# Patient Record
Sex: MALE | Race: WHITE | Employment: FULL TIME | URBAN - METROPOLITAN AREA
[De-identification: names, ages, dates, MRNs, and addresses within clinical notes are randomized per-mention and may not be internally consistent; named-entity substitution may affect disease eponyms.]

---

## 2017-07-25 ENCOUNTER — HOSPITAL ENCOUNTER (OUTPATIENT)
Dept: LAB | Age: 72
Discharge: HOME | End: 2017-07-25
Payer: COMMERCIAL

## 2017-07-25 ENCOUNTER — OFFICE VISIT (OUTPATIENT)
Dept: INTERNAL MEDICINE CLINIC | Age: 72
End: 2017-07-25

## 2017-07-25 VITALS
TEMPERATURE: 98.2 F | OXYGEN SATURATION: 97 % | BODY MASS INDEX: 29.82 KG/M2 | HEART RATE: 83 BPM | WEIGHT: 213 LBS | RESPIRATION RATE: 18 BRPM | HEIGHT: 71 IN | DIASTOLIC BLOOD PRESSURE: 63 MMHG | SYSTOLIC BLOOD PRESSURE: 139 MMHG

## 2017-07-25 DIAGNOSIS — J45.909 UNCOMPLICATED ASTHMA, UNSPECIFIED ASTHMA SEVERITY: ICD-10-CM

## 2017-07-25 DIAGNOSIS — E78.5 DYSLIPIDEMIA: Primary | ICD-10-CM

## 2017-07-25 DIAGNOSIS — Z76.0 MEDICATION REFILL: ICD-10-CM

## 2017-07-25 DIAGNOSIS — F41.9 ANXIETY: ICD-10-CM

## 2017-07-25 DIAGNOSIS — E78.5 DYSLIPIDEMIA: ICD-10-CM

## 2017-07-25 DIAGNOSIS — Z23 ENCOUNTER FOR IMMUNIZATION: ICD-10-CM

## 2017-07-25 LAB
ALBUMIN SERPL BCP-MCNC: 4.2 G/DL (ref 3.4–5)
ALBUMIN/GLOB SERPL: 1.3 {RATIO} (ref 0.8–1.7)
ALP SERPL-CCNC: 78 U/L (ref 45–117)
ALT SERPL-CCNC: 27 U/L (ref 16–61)
AST SERPL W P-5'-P-CCNC: 16 U/L (ref 15–37)
BILIRUB DIRECT SERPL-MCNC: 0.1 MG/DL (ref 0–0.2)
BILIRUB SERPL-MCNC: 0.4 MG/DL (ref 0.2–1)
CHOLEST SERPL-MCNC: 284 MG/DL
GLOBULIN SER CALC-MCNC: 3.2 G/DL (ref 2–4)
HDLC SERPL-MCNC: 62 MG/DL (ref 40–60)
HDLC SERPL: 4.6 {RATIO} (ref 0–5)
LDLC SERPL CALC-MCNC: 184.2 MG/DL (ref 0–100)
LIPID PROFILE,FLP: ABNORMAL
PROT SERPL-MCNC: 7.4 G/DL (ref 6.4–8.2)
TRIGL SERPL-MCNC: 189 MG/DL (ref ?–150)
VLDLC SERPL CALC-MCNC: 37.8 MG/DL

## 2017-07-25 PROCEDURE — 80076 HEPATIC FUNCTION PANEL: CPT | Performed by: INTERNAL MEDICINE

## 2017-07-25 PROCEDURE — 80061 LIPID PANEL: CPT | Performed by: INTERNAL MEDICINE

## 2017-07-25 PROCEDURE — 36415 COLL VENOUS BLD VENIPUNCTURE: CPT | Performed by: INTERNAL MEDICINE

## 2017-07-25 RX ORDER — SIMVASTATIN 40 MG/1
40 TABLET, FILM COATED ORAL DAILY
Qty: 90 TAB | Refills: 5 | Status: SHIPPED | OUTPATIENT
Start: 2017-07-25 | End: 2018-06-11 | Stop reason: SDUPTHER

## 2017-07-25 RX ORDER — VENLAFAXINE 75 MG/1
75 TABLET ORAL 3 TIMES DAILY
Qty: 270 TAB | Refills: 5 | Status: SHIPPED | OUTPATIENT
Start: 2017-07-25 | End: 2018-09-12 | Stop reason: SDUPTHER

## 2017-07-25 NOTE — PROGRESS NOTES
Chief Complaint   Patient presents with    Cholesterol Problem       Pt preferred language for health care discussion is English. Is someone accompanying this pt? no    Is the patient using any DME equipment during OV? no    Depression Screening:  PHQ over the last two weeks 7/25/2017 10/22/2016 10/15/2015   PHQ Not Done Active Diagnosis of Depression or Bipolar Disorder - -   Little interest or pleasure in doing things Not at all Several days Not at all   Feeling down, depressed or hopeless Not at all Several days Not at all   Total Score PHQ 2 0 2 0       Learning Assessment:  Learning Assessment 11/11/2016 12/16/2013   PRIMARY LEARNER Patient Patient   PRIMARY LANGUAGE ENGLISH ENGLISH   LEARNER PREFERENCE PRIMARY DEMONSTRATION LISTENING   ANSWERED BY Patient patient   RELATIONSHIP SELF SELF       Abuse Screening:  No flowsheet data found. Fall Risk  Fall Risk Assessment, last 12 mths 7/25/2017 11/11/2016 10/22/2016 10/15/2015   Able to walk? Yes Yes Yes Yes   Fall in past 12 months? No Yes Yes No   Fall with injury? - Yes No -   Number of falls in past 12 months - 2 - -   Fall Risk Score - 3 - -         Health Maintenance reviewed and discussed per provider. Yes    Pt is due for Colonoscopy ( Med Rec Request filled out and faxed), Eye exam (Med rec request filled out and faxed, pt has taken it home and will bring back can't remember who he has seen), Pneumo-13 or Peumo-23. Please order/place referral if appropriate. Advance Directive:  1. Do you have an advance directive in place? Patient Reply:no    2. If not, would you like material regarding how to put one in place? Patient Reply: no    Coordination of Care:  1. Have you been to the ER, urgent care clinic since your last visit? Hospitalized since your last visit? no    2. Have you seen or consulted any other health care providers outside of the [facility] since your last visit? Include any pap smears or colon screening.  no

## 2017-07-25 NOTE — PATIENT INSTRUCTIONS
Vaccine Information Statement     Pneumococcal Conjugate Vaccine (PCV13): What You Need to Know    Many Vaccine Information Statements are available in Vietnamese and other languages. See www.immunize.org/vis. Hojas de información Sobre Vacunas están disponibles en español y en muchos otros idiomas. Visite www.immunize.org/vis. 1. Why get vaccinated? Vaccination can protect both children and adults from pneumococcal disease. Pneumococcal disease is caused by bacteria that can spread from person to person through close contact. It can cause ear infections, and it can also lead to more serious infections of the:   Lungs (pneumonia),   Blood (bacteremia), and   Covering of the brain and spinal cord (meningitis). Pneumococcal pneumonia is most common among adults. Pneumococcal meningitis can cause deafness and brain damage, and it kills about 1 child in 10 who get it. Anyone can get pneumococcal disease, but children under 3years of age and adults 72 years and older, people with certain medical conditions, and cigarette smokers are at the highest risk. Before there was a vaccine, the [facility] saw:   more than 700 cases of meningitis,   about 13,000 blood infections,   about 5 million ear infections, and   about 200 deaths  in children under 5 each year from pneumococcal disease. Since vaccine became available, severe pneumococcal disease in these children has fallen by 88%. About 18,000 older adults die of pneumococcal disease each year in the [place]. Treatment of pneumococcal infections with penicillin and other drugs is not as effective as it used to be, because some strains of the disease have become resistant to these drugs. This makes prevention of the disease, through vaccination, even more important. 2. PCV13 vaccine    Pneumococcal conjugate vaccine (called PCV13) protects against 13 types of pneumococcal bacteria.       PCV13 is routinely given to children at 2, 4, 6, and 1515 months of age. It is also recommended for children and adults 3to 59years of age with certain health conditions, and for all adults 72years of age and older. Your doctor can give you details. 3. Some people should not get this vaccine    Anyone who has ever had a life-threatening allergic reaction to a dose of this vaccine, to an earlier pneumococcal vaccine called PCV7, or to any vaccine containing diphtheria toxoid (for example, DTaP), should not get PCV13. Anyone with a severe allergy to any component of PCV13 should not get the vaccine. Tell your doctor if the person being vaccinated has any severe allergies. If the person scheduled for vaccination is not feeling well, your healthcare provider might decide to reschedule the shot on another day. 4. Risks of a vaccine reaction    With any medicine, including vaccines, there is a chance of reactions. These are usually mild and go away on their own, but serious reactions are also possible. Problems reported following PCV13 varied by age and dose in the series. The most common problems reported among children were:    About half became drowsy after the shot, had a temporary loss of appetite, or had redness or tenderness where the shot was given.  About 1 out of 3 had swelling where the shot was given.  About 1 out of 3 had a mild fever, and about 1 in 20 had a fever over 102.2°F.   Up to about 8 out of 10 became fussy or irritable. Adults have reported pain, redness, and swelling where the shot was given; also mild fever, fatigue, headache, chills, or muscle pain. [place] children who get PCV13 along with inactivated flu vaccine at the same time may be at increased risk for seizures caused by fever. Ask your doctor for more information. Problems that could happen after any vaccine:     People sometimes faint after a medical procedure, including vaccination.  Sitting or lying down for about 15 minutes can help prevent fainting, and injuries caused by a fall. Tell your doctor if you feel dizzy, or have vision changes or ringing in the ears.  Some older children and adults get severe pain in the shoulder and have difficulty moving the arm where a shot was given. This happens very rarely.  Any medication can cause a severe allergic reaction. Such reactions from a vaccine are very rare, estimated at about 1 in a million doses, and would happen within a few minutes to a few hours after the vaccination. As with any medicine, there is a very small chance of a vaccine causing a serious injury or death. The safety of vaccines is always being monitored. For more information, visit: www.cdc.gov/vaccinesafety/     5. What if there is a serious reaction? What should I look for?  Look for anything that concerns you, such as signs of a severe allergic reaction, very high fever, or unusual behavior. Signs of a severe allergic reaction can include hives, swelling of the face and throat, difficulty breathing, a fast heartbeat, dizziness, and weakness  usually within a few minutes to a few hours after the vaccination. What should I do?  If you think it is a severe allergic reaction or other emergency that cant wait, call 9-1-1 or get the person to the nearest hospital. Otherwise, call your doctor. Reactions should be reported to the Vaccine Adverse Event Reporting System (VAERS). Your doctor should file this report, or you can do it yourself through the VAERS web site at www.vaers. hhs.gov, or by calling 8-270.278.2048. VAERS does not give medical advice. 6. The National Vaccine Injury Compensation Program    The [facility] Vaccine Injury Compensation Program (VICP) is a federal program that was created to compensate people who may have been injured by certain vaccines.     Persons who believe they may have been injured by a vaccine can learn about the program and about filing a claim by calling 1-549.897.4614 or visiting the [facility] [address] website at [email or web address]/vaccinecompensation. There is a time limit to file a claim for compensation. 7. How can I learn more?  Ask your healthcare provider. He or she can give you the vaccine package insert or suggest other sources of information.  Call your local or state health department.  Contact the Centers for Disease Control and Prevention (CDC):  - Call 7-894.219.5071 (1-800-CDC-INFO) or  - Visit CDCs website at www.cdc.gov/vaccines    Vaccine Information Statement   PCV13 Vaccine   11/5/2015   42 JUAN Newman [id number]    Department of Health and Human Services  Centers for Disease Control and Prevention    Office Use Only

## 2017-07-25 NOTE — MR AVS SNAPSHOT
Visit Information Date & Time Provider Department Dept. Phone Encounter #  
 7/25/2017 11:00 AM [address] [phone] [id number] Follow-up Instructions Return in about 6 months (around 1/25/2018) for annual CPE, cholesterol. Upcoming Health Maintenance Date Due  
 GLAUCOMA SCREENING Q2Y 8/29/2011 Pneumococcal 65+ Low/Medium Risk (1 of 2 - PCV13) 8/29/2011 COLONOSCOPY 10/5/2016 ZOSTER VACCINE AGE 60> 7/15/2019* INFLUENZA AGE 9 TO ADULT 8/1/2017 MEDICARE YEARLY EXAM 10/23/2017 DTaP/Tdap/Td series (2 - Td) 12/16/2023 *Topic was postponed. The date shown is not the original due date. Allergies as of 7/25/2017  Review Complete On: 7/25/2017 By: Dorina Isbell MD  
 No Known Allergies Current Immunizations  Reviewed on 10/28/2016 Name Date Influenza High Dose Vaccine PF 10/28/2016 12:22 PM, 10/15/2015 Influenza Vaccine PF 9/26/2014 Tdap 12/16/2013 11:16 AM  
  
 Not reviewed this visit You Were Diagnosed With   
  
 Codes Comments Dyslipidemia    -  Primary ICD-10-CM: E78.5 ICD-9-CM: 272.4 Anxiety     ICD-10-CM: F41.9 ICD-9-CM: 300.00 Uncomplicated asthma, unspecified asthma severity     ICD-10-CM: J45.909 ICD-9-CM: 493.90 Medication refill     ICD-10-CM: Z76.0 ICD-9-CM: V68.1 Vitals BP Pulse Temp Resp Height(growth percentile) Weight(growth percentile) 139/63 83 98.2 °F (36.8 °C) (Oral) 18 5' 11\" (1.803 m) 213 lb (96.6 kg) SpO2 BMI Smoking Status 97% 29.71 kg/m2 Former Smoker BMI and BSA Data Body Mass Index Body Surface Area  
 29.71 kg/m 2 2.2 m 2 Preferred Pharmacy Pharmacy Name Phone [facility] [facility]. [phone] Your Updated Medication List  
  
   
This list is accurate as of: 7/25/17 12:10 PM.  Always use your most recent med list.  
  
  
  
  
 ALPRAZolam 0.5 mg tablet Commonly known as:  Curtis Jimenez Take 1 Tab by mouth three (3) times daily as needed for Anxiety. Cannot E-scribe. RX printed for   Please notify patient. ASPIR-81 PO Take  by mouth. diclofenac EC 50 mg EC tablet Commonly known as:  VOLTAREN Take one po tid prn pain  
  
 simvastatin 40 mg tablet Commonly known as:  ZOCOR Take 1 Tab by mouth daily. Indications: hypercholesterolemia  
  
 venlafaxine 75 mg tablet Commonly known as:  [facility] Take 1 Tab by mouth three (3) times daily. Prescriptions Sent to Pharmacy Refills  
 venlafaxine (EFFEXOR) 75 mg tablet 5 Sig: Take 1 Tab by mouth three (3) times daily. Class: Normal  
 Pharmacy: [address] [place] [address]. Ph #: [phone] Route: Oral  
 simvastatin (ZOCOR) 40 mg tablet 5 Sig: Take 1 Tab by mouth daily. Indications: hypercholesterolemia Class: Normal  
 Pharmacy: [address] [place] [address]. Ph #: [phone] Route: Oral  
  
Follow-up Instructions Return in about 6 months (around 1/25/2018) for annual CPE, cholesterol. To-Do List   
 07/25/2017 Lab:  HEPATIC FUNCTION PANEL   
  
 07/25/2017 Lab:  LIPID PANEL Introducing [facility]! Dear Rk Rodriguez: Thank you for requesting a [employer] account. Our records indicate that you already have an active [employer] account. You can access your account anytime at [email or web address] Did you know that you can access your hospital and ER discharge instructions at any time in [employer]? You can also review all of your test results from your hospital stay or ER visit. Additional Information If you have questions, please visit the Frequently Asked Questions section of the [employer] website at [email or web address] Remember, [employer] is NOT to be used for urgent needs. For medical emergencies, dial 911. Now available from your iPhone and Android! Please provide this summary of care documentation to your next provider. Your primary care clinician is listed as [facility]. If you have any questions after today's visit, please call [phone].

## 2017-07-25 NOTE — PROGRESS NOTES
HISTORY OF PRESENT ILLNESS  Frank Herrera is a 80 y.o. male. Visit Vitals    /63    Pulse 83    Temp 98.2 °F (36.8 °C) (Oral)    Resp 18    Ht 5' 11\" (1.803 m)    Wt 213 lb (96.6 kg)    SpO2 97%    BMI 29.71 kg/m2       HPI Comments: Still working at [employer] here. Will qualify for better pension in January 2018 and then by end of year can retire with wife getting full pension if he dies. Has stopped smoking weed. Reports he feels better. He sleeps better. His breathing is better. He is going to meetings 2-3 times a week. He does admit to some depression. He does have a therapist.  Rarely uses the xanax    Was also dx'd with adult onset asthma by the [place]  Has been having a few more sxs in recent weeks    Cholesterol Problem   The history is provided by the patient. This is a chronic problem. The current episode started more than 1 week ago. The problem occurs daily. The problem has not changed since onset. Associated symptoms include shortness of breath (intermittent). Exacerbated by: diet. The symptoms are relieved by medications (diet). Treatments tried: statin. The treatment provided moderate relief. Review of Systems   Constitutional: Negative. HENT: Negative. Eyes: Negative. Respiratory: Positive for shortness of breath (intermittent). Cardiovascular: Negative. Neurological: Negative. Physical Exam   Constitutional: He is oriented to person, place, and time. He appears well-developed and well-nourished. No distress. Cardiovascular: Normal rate and regular rhythm. Pulmonary/Chest: Effort normal and breath sounds normal.   Musculoskeletal: He exhibits no edema. Neurological: He is alert and oriented to person, place, and time. Skin: Skin is warm and dry. He is not diaphoretic. Psychiatric: He has a normal mood and affect. Nursing note and vitals reviewed. ASSESSMENT and PLAN    ICD-10-CM ICD-9-CM    1.  Dyslipidemia E78.5 272.4 LIPID PANEL HEPATIC FUNCTION PANEL   2. Anxiety F41.9 300.00    3. Uncomplicated asthma, unspecified asthma severity J45.909 493.90    4. Medication refill Z76.0 V68.1 venlafaxine (EFFEXOR) 75 mg tablet      simvastatin (ZOCOR) 40 mg tablet   5. Encounter for immunization Z23 V03.89 PNEUMOCOCCAL CONJ VACCINE 13 VALENT IM     Update lab    Refills as noted    Discussed BMI/weight, lifestyle, diet and exercise. He is aware.     F/u 6 months

## 2017-07-26 RX ORDER — ALBUTEROL SULFATE 90 UG/1
2 AEROSOL, METERED RESPIRATORY (INHALATION)
Qty: 1 INHALER | Refills: 0
Start: 2017-07-26 | End: 2017-08-02 | Stop reason: SDUPTHER

## 2017-08-02 ENCOUNTER — PATIENT MESSAGE (OUTPATIENT)
Dept: INTERNAL MEDICINE CLINIC | Age: 72
End: 2017-08-02

## 2017-08-02 DIAGNOSIS — J45.909 UNCOMPLICATED ASTHMA, UNSPECIFIED ASTHMA SEVERITY: ICD-10-CM

## 2017-08-02 DIAGNOSIS — R05.3 PERSISTENT COUGH: Primary | ICD-10-CM

## 2017-08-02 DIAGNOSIS — Z76.0 MEDICATION REFILL: ICD-10-CM

## 2017-08-03 RX ORDER — ALPRAZOLAM 0.5 MG/1
0.5 TABLET ORAL
Qty: 45 TAB | Refills: 5 | Status: SHIPPED | OUTPATIENT
Start: 2017-08-03 | End: 2017-12-11 | Stop reason: SDUPTHER

## 2017-08-03 RX ORDER — ALBUTEROL SULFATE 90 UG/1
2 AEROSOL, METERED RESPIRATORY (INHALATION)
Qty: 1 INHALER | Refills: 5 | Status: SHIPPED | OUTPATIENT
Start: 2017-08-03 | End: 2018-12-11

## 2017-08-03 NOTE — TELEPHONE ENCOUNTER
From: Sera Rogers  To: Elissa Guallpa MD  Sent: 8/2/2017 7:11 PM EDT  Subject: Medication Renewal Request    Original authorizing provider: MD Sera Ratliff would like a refill of the following medications:  ALPRAZolam Cedric Juan Francisco) 0.5 mg tablet Davionfrancisca Howe MD]  albuterol (PROVENTIL HFA) 90 mcg/actuation inhaler [ZGEVINCENZO Howe MD]    Preferred pharmacy: [place] [address], [address] [facility].     Comment:

## 2017-08-03 NOTE — TELEPHONE ENCOUNTER
From: Alen Roa  To: Trace Pelayo MD  Sent: 8/2/2017 1:56 PM EDT  Subject: Referral Request    Sudeep Castillo thinks I should see an Allergist? I think a consult with a lung doc, minimum a PFT.  can you assist?  A lot of aggressive coughing, usually at night, using ventilator more, than ever before            /

## 2017-08-03 NOTE — TELEPHONE ENCOUNTER
No  came up tried with three different date ranges     Last UDS date: none  Pain contract signed: non  Last office visit: 7/25/17  Next Appt: 1/25/2018

## 2017-08-22 ENCOUNTER — HOSPITAL ENCOUNTER (EMERGENCY)
Age: 72
Discharge: HOME | End: 2017-08-23
Attending: EMERGENCY MEDICINE | Admitting: EMERGENCY MEDICINE
Payer: COMMERCIAL

## 2017-08-22 ENCOUNTER — APPOINTMENT (OUTPATIENT)
Dept: GENERAL RADIOLOGY | Age: 72
End: 2017-08-22
Attending: EMERGENCY MEDICINE
Payer: COMMERCIAL

## 2017-08-22 DIAGNOSIS — J45.21 MILD INTERMITTENT ASTHMA WITH ACUTE EXACERBATION: Primary | ICD-10-CM

## 2017-08-22 LAB
ANION GAP SERPL CALC-SCNC: 7 MMOL/L (ref 3–18)
BASOPHILS # BLD: 0.1 K/UL (ref 0–0.06)
BASOPHILS NFR BLD: 1 % (ref 0–2)
BUN SERPL-MCNC: 16 MG/DL (ref 7–18)
BUN/CREAT SERPL: 16 (ref 12–20)
CALCIUM SERPL-MCNC: 8.4 MG/DL (ref 8.5–10.1)
CHLORIDE SERPL-SCNC: 107 MMOL/L (ref 100–108)
CO2 SERPL-SCNC: 27 MMOL/L (ref 21–32)
CREAT SERPL-MCNC: 0.99 MG/DL (ref 0.6–1.3)
DIFFERENTIAL METHOD BLD: ABNORMAL
EOSINOPHIL # BLD: 0.7 K/UL (ref 0–0.4)
EOSINOPHIL NFR BLD: 8 % (ref 0–5)
ERYTHROCYTE [DISTWIDTH] IN BLOOD BY AUTOMATED COUNT: 13.9 % (ref 11.6–14.5)
GLUCOSE SERPL-MCNC: 130 MG/DL (ref 74–99)
HCT VFR BLD AUTO: 43.6 % (ref 36–48)
HGB BLD-MCNC: 14.7 G/DL (ref 13–16)
LYMPHOCYTES # BLD: 2.6 K/UL (ref 0.9–3.6)
LYMPHOCYTES NFR BLD: 31 % (ref 21–52)
MCH RBC QN AUTO: 31.9 PG (ref 24–34)
MCHC RBC AUTO-ENTMCNC: 33.7 G/DL (ref 31–37)
MCV RBC AUTO: 94.6 FL (ref 74–97)
MONOCYTES # BLD: 0.6 K/UL (ref 0.05–1.2)
MONOCYTES NFR BLD: 7 % (ref 3–10)
NEUTS SEG # BLD: 4.5 K/UL (ref 1.8–8)
NEUTS SEG NFR BLD: 53 % (ref 40–73)
PLATELET # BLD AUTO: 290 K/UL (ref 135–420)
PMV BLD AUTO: 9.7 FL (ref 9.2–11.8)
POTASSIUM SERPL-SCNC: 4 MMOL/L (ref 3.5–5.5)
RBC # BLD AUTO: 4.61 M/UL (ref 4.7–5.5)
SODIUM SERPL-SCNC: 141 MMOL/L (ref 136–145)
TROPONIN I SERPL-MCNC: <0.02 NG/ML (ref 0–0.04)
WBC # BLD AUTO: 8.4 K/UL (ref 4.6–13.2)

## 2017-08-22 PROCEDURE — 71020 XR CHEST PA LAT: CPT

## 2017-08-22 PROCEDURE — 85025 COMPLETE CBC W/AUTO DIFF WBC: CPT | Performed by: EMERGENCY MEDICINE

## 2017-08-22 PROCEDURE — 74011000250 HC RX REV CODE- 250: Performed by: EMERGENCY MEDICINE

## 2017-08-22 PROCEDURE — 80048 BASIC METABOLIC PNL TOTAL CA: CPT | Performed by: EMERGENCY MEDICINE

## 2017-08-22 PROCEDURE — 94640 AIRWAY INHALATION TREATMENT: CPT

## 2017-08-22 PROCEDURE — 99284 EMERGENCY DEPT VISIT MOD MDM: CPT

## 2017-08-22 PROCEDURE — 77030029684 HC NEB SM VOL KT MONA -A

## 2017-08-22 PROCEDURE — 96374 THER/PROPH/DIAG INJ IV PUSH: CPT

## 2017-08-22 PROCEDURE — 84484 ASSAY OF TROPONIN QUANT: CPT | Performed by: EMERGENCY MEDICINE

## 2017-08-22 PROCEDURE — 93005 ELECTROCARDIOGRAM TRACING: CPT

## 2017-08-22 PROCEDURE — 74011250636 HC RX REV CODE- 250/636: Performed by: EMERGENCY MEDICINE

## 2017-08-22 RX ORDER — ALBUTEROL SULFATE 0.83 MG/ML
2.5 SOLUTION RESPIRATORY (INHALATION)
Status: DISCONTINUED | OUTPATIENT
Start: 2017-08-22 | End: 2017-08-23 | Stop reason: HOSPADM

## 2017-08-22 RX ORDER — IPRATROPIUM BROMIDE AND ALBUTEROL SULFATE 2.5; .5 MG/3ML; MG/3ML
3 SOLUTION RESPIRATORY (INHALATION) ONCE
Status: COMPLETED | OUTPATIENT
Start: 2017-08-22 | End: 2017-08-22

## 2017-08-22 RX ORDER — IPRATROPIUM BROMIDE AND ALBUTEROL SULFATE 2.5; .5 MG/3ML; MG/3ML
3 SOLUTION RESPIRATORY (INHALATION)
Status: COMPLETED | OUTPATIENT
Start: 2017-08-22 | End: 2017-08-22

## 2017-08-22 RX ADMIN — IPRATROPIUM BROMIDE AND ALBUTEROL SULFATE 3 ML: .5; 3 SOLUTION RESPIRATORY (INHALATION) at 23:07

## 2017-08-22 RX ADMIN — IPRATROPIUM BROMIDE AND ALBUTEROL SULFATE 3 ML: .5; 3 SOLUTION RESPIRATORY (INHALATION) at 21:56

## 2017-08-22 RX ADMIN — ALBUTEROL SULFATE 2.5 MG: 2.5 SOLUTION RESPIRATORY (INHALATION) at 22:21

## 2017-08-22 RX ADMIN — METHYLPREDNISOLONE SODIUM SUCCINATE 125 MG: 125 INJECTION, POWDER, FOR SOLUTION INTRAMUSCULAR; INTRAVENOUS at 22:01

## 2017-08-23 VITALS
WEIGHT: 210 LBS | TEMPERATURE: 98.4 F | DIASTOLIC BLOOD PRESSURE: 60 MMHG | RESPIRATION RATE: 18 BRPM | HEIGHT: 71 IN | OXYGEN SATURATION: 99 % | HEART RATE: 99 BPM | SYSTOLIC BLOOD PRESSURE: 130 MMHG | BODY MASS INDEX: 29.4 KG/M2

## 2017-08-23 LAB
ATRIAL RATE: 92 BPM
CALCULATED P AXIS, ECG09: 54 DEGREES
CALCULATED R AXIS, ECG10: 34 DEGREES
CALCULATED T AXIS, ECG11: 79 DEGREES
DIAGNOSIS, 93000: NORMAL
P-R INTERVAL, ECG05: 114 MS
Q-T INTERVAL, ECG07: 358 MS
QRS DURATION, ECG06: 76 MS
QTC CALCULATION (BEZET), ECG08: 442 MS
VENTRICULAR RATE, ECG03: 92 BPM

## 2017-08-23 RX ORDER — PREDNISONE 20 MG/1
60 TABLET ORAL DAILY
Qty: 15 TAB | Refills: 0 | Status: SHIPPED | OUTPATIENT
Start: 2017-08-23 | End: 2017-08-28

## 2017-08-23 RX ORDER — ALBUTEROL SULFATE 90 UG/1
2 AEROSOL, METERED RESPIRATORY (INHALATION)
Qty: 1 INHALER | Refills: 0 | Status: SHIPPED | OUTPATIENT
Start: 2017-08-23 | End: 2018-12-11

## 2017-08-23 NOTE — ED PROVIDER NOTES
Bimal Shaw  [facility] EMERGENCY DEPT      80 y.o. male with noted past medical history, including Adult onset Asthma, who presents to the emergency department c/o SOB and wheezing onset earlier this evening. Associated sxs include nausea and lightheadedness. Reports using inhaler at home without relief. Admits to smoking previously for many years, reporting sxs seemed to get worse after quitting ~ 100 days ago. Reports having a physical 2 months ago when he had PNA vaccination at that time, but no other recent illnesses. Denies cardiac hx and EtOH use. Patient has no other complaints or medical concerns at this time. No other complaints. Nursing nurses regarding the HPI and triage nursing notes were reviewed. Current Facility-Administered Medications   Medication Dose Route Frequency    albuterol (PROVENTIL VENTOLIN) nebulizer solution 2.5 mg  2.5 mg Nebulization Q15MIN PRN     Current Outpatient Prescriptions   Medication Sig    ALPRAZolam (XANAX) 0.5 mg tablet Take 1 Tab by mouth three (3) times daily as needed for Anxiety. Cannot E-scribe. RX printed for     Please notify patient.  albuterol (PROVENTIL HFA) 90 mcg/actuation inhaler Take 2 Puffs by inhalation every six (6) hours as needed for Wheezing.  venlafaxine (EFFEXOR) 75 mg tablet Take 1 Tab by mouth three (3) times daily.  simvastatin (ZOCOR) 40 mg tablet Take 1 Tab by mouth daily. Indications: hypercholesterolemia    diclofenac EC (VOLTAREN) 50 mg EC tablet Take one po tid prn pain    ASPIRIN (ASPIR-81 PO) Take  by mouth. Past Medical History:   Diagnosis Date    Asthma     adult onset    Baker's cyst of knee     Diverticulosis     severe    Hepatitis C infection 2002    has been treated and levels dropped to nothing.     Hypercholesterolemia     Wrist pain        Past Surgical History:   Procedure Laterality Date    HX COLONOSCOPY  2005    10 yr f/u    HX COLONOSCOPY  4/5/16    f/u 3-6 mos due to poor prep    HX COLONOSCOPY      10/2016 Dr Roberts Ana Maria [facility])       Family History   Problem Relation Age of Onset    Depression Sister        Social History     Social History    Marital status: SINGLE     Spouse name: N/A    Number of children: N/A    Years of education: N/A     Occupational History    [employer]     Social History Main Topics    Smoking status: Former Smoker     Quit date: 5/28/1987    Smokeless tobacco: Never Used      Comment: continue    Alcohol use No    Drug use: No    Sexual activity: Yes     Partners: Female     Other Topics Concern    Not on file     Social History Narrative       No Known Allergies    Patient's primary care provider (as noted in EPIC): Chanell Mcneil MD    REVIEW OF SYSTEMS:    Constitutional:  Negative for diaphoresis. Eyes:  Negative for diploplia. HENT:  Negative for congestion. Respiratory:  Negative for stridor. Cardiovascular:  Negative for palpitations. Gastrointestinal:  Negative for diarrhea. Genitourinary:  Negative for flank pain. Musculoskeletal:  Negative for back pain. Skin:  Negative for pallor. Neurological:  Negative for weakness. Psychiatric:  Negative for hallucinations. Visit Vitals    /66    Pulse 100    Temp 98.4 °F (36.9 °C)    Resp 15    Ht 5' 11\" (1.803 m)    Wt 95.3 kg (210 lb)    SpO2 98%    BMI 29.29 kg/m2       PHYSICAL EXAM:    CONSTITUTIONAL:  Alert, in no apparent distress;  well developed;  well nourished. HEAD:  Normocephalic, atraumatic. EYES:  EOMI. Non-icteric sclera. Normal conjunctiva. ENTM:  Nose:  no rhinorrhea. Throat:  no erythema or exudate, mucous membranes moist.  NECK:  No JVD. Supple    RESPIRATORY:  Expiratory wheezes but good air movement. No rales or rhonchi. CARDIOVASCULAR:  Regular rate and rhythm. No murmurs, rubs, or gallops. GI:  Normal bowel sounds, abdomen soft and non-tender. No rebound or guarding. BACK:  Non-tender.   UPPER EXT:  Normal inspection. LOWER EXT:  No edema, no calf tenderness. Distal pulses intact. NEURO:  Moves all four extremities, and grossly normal motor exam.  SKIN:  No rashes;  Normal for age. PSYCH:  Alert and normal affect. DIFFERENTIAL DIAGNOSES/ MEDICAL DECISION MAKING:  Acute asthma exacerbation, acute bronchitis, pneumonia, upper respiratory infection, pulmonary embolism, bronchospasm, various cardiac etiologies verus numerous other etiologies versus combination of the above. Abnormal lab results from this emergency department encounter:  Labs Reviewed   CBC WITH AUTOMATED DIFF - Abnormal; Notable for the following:        Result Value    RBC 4.61 (*)     EOSINOPHILS 8 (*)     ABS. EOSINOPHILS 0.7 (*)     ABS.  BASOPHILS 0.1 (*)     All other components within normal limits   METABOLIC PANEL, BASIC - Abnormal; Notable for the following:     Glucose 130 (*)     Calcium 8.4 (*)     All other components within normal limits   TROPONIN I   URINALYSIS W/ RFLX MICROSCOPIC       Lab values for this patient within approximately the last 12 hours:  Recent Results (from the past 12 hour(s))   EKG, 12 LEAD, INITIAL    Collection Time: 08/22/17  9:51 PM   Result Value Ref Range    Ventricular Rate 92 BPM    Atrial Rate 92 BPM    P-R Interval 114 ms    QRS Duration 76 ms    Q-T Interval 358 ms    QTC Calculation (Bezet) 442 ms    Calculated P Axis 54 degrees    Calculated R Axis 34 degrees    Calculated T Axis 79 degrees    Diagnosis       Normal sinus rhythm  Nonspecific ST abnormality  Abnormal ECG  No previous ECGs available     CBC WITH AUTOMATED DIFF    Collection Time: 08/22/17 10:05 PM   Result Value Ref Range    WBC 8.4 4.6 - 13.2 K/uL    RBC 4.61 (L) 4.70 - 5.50 M/uL    HGB 14.7 13.0 - 16.0 g/dL    HCT 43.6 36.0 - 48.0 %    MCV 94.6 74.0 - 97.0 FL    MCH 31.9 24.0 - 34.0 PG    MCHC 33.7 31.0 - 37.0 g/dL    RDW 13.9 11.6 - 14.5 %    PLATELET 190 906 - 606 K/uL    MPV 9.7 9.2 - 11.8 FL    NEUTROPHILS 53 40 - 73 % LYMPHOCYTES 31 21 - 52 %    MONOCYTES 7 3 - 10 %    EOSINOPHILS 8 (H) 0 - 5 %    BASOPHILS 1 0 - 2 %    ABS. NEUTROPHILS 4.5 1.8 - 8.0 K/UL    ABS. LYMPHOCYTES 2.6 0.9 - 3.6 K/UL    ABS. MONOCYTES 0.6 0.05 - 1.2 K/UL    ABS. EOSINOPHILS 0.7 (H) 0.0 - 0.4 K/UL    ABS. BASOPHILS 0.1 (H) 0.0 - 0.06 K/UL    DF AUTOMATED     METABOLIC PANEL, BASIC    Collection Time: 08/22/17 10:05 PM   Result Value Ref Range    Sodium 141 136 - 145 mmol/L    Potassium 4.0 3.5 - 5.5 mmol/L    Chloride 107 100 - 108 mmol/L    CO2 27 21 - 32 mmol/L    Anion gap 7 3.0 - 18 mmol/L    Glucose 130 (H) 74 - 99 mg/dL    BUN 16 7.0 - 18 MG/DL    Creatinine 0.99 0.6 - 1.3 MG/DL    BUN/Creatinine ratio 16 12 - 20      GFR est AA >60 >60 ml/min/1.73m2    GFR est non-AA >60 >60 ml/min/1.73m2    Calcium 8.4 (L) 8.5 - 10.1 MG/DL   TROPONIN I    Collection Time: 08/22/17 10:05 PM   Result Value Ref Range    Troponin-I, Qt. <0.02 0.0 - 0.045 NG/ML       Radiologist and cardiologist interpretations if available at time of this note:  No results found. CXR:  Preliminary review of x-rays by ED Physician. Interpretation of chest X-ray shows, no infiltrates, no pneumothorax, no CHF, no effusion. EKG reading by Abhinav Ledbetter MD:  NSR about 90 bpm with normal width QRS. No STEMI. No ST depression. Medication(s) ordered for patient during this emergency visit encounter:  Medications   albuterol (PROVENTIL VENTOLIN) nebulizer solution 2.5 mg (2.5 mg Nebulization Given 8/22/17 2221)   albuterol-ipratropium (DUO-NEB) 2.5 MG-0.5 MG/3 ML (3 mL Nebulization Given 8/22/17 2156)   methylPREDNISolone (PF) (SOLU-MEDROL) injection 125 mg (125 mg IntraVENous Given 8/22/17 2201)   albuterol-ipratropium (DUO-NEB) 2.5 MG-0.5 MG/3 ML (3 mL Nebulization Given 8/22/17 6717)       ED COURSE AND MEDICAL DECISION MAKING:  Patient's breathing improved and wheezing resolved in the emergency department with the noted albuterol and atrovent HHN treatments.   Patient's initial steroid therapy may have been started in the ED as well. Patient is well and can be discharged home. There are no other medical conditions that I believe are significantly contributing to the patient's shortness of breath except the noted acute asthma exacerbation and any noted triggers. IMPRESSION AND MEDICAL DECISION MAKING:  Based upon the patient's presentation with noted HPI and PE, along with the work up done in the emergency department, I believe that the patient is having an acute asthma exacerbation in an asthmatic patient. DIAGNOSIS:  1. Acute asthma exacerbation. SPECIFIC PATIENT INSTRUCTIONS FROM THE PHYSICIAN WHO TREATED YOU IN THE ER TODAY:  1. Return if any concerns or worsening of condition(s)  2. Prednisone as prescribed until finished. 3. Use your albuterol inhaler, if prescribed, and/or home nebulizer machine (if you have one) for wheezing. 4. FOLLOW UP APPOINTMENT:  Your primary doctor in 1-2 days at the [address] [place] [address] for further outpatient management including pulmonary function test and being seen by a pulmonologist.      Marielos Mayorga M.D. Scribe Attestation:   August 22, 2017 at 10:30 PM - Laura Gonzalez scribing for and in the presence of Yosi Mayorga M.D. Signed by: Nestor Dumas, 08/22/17 10:30 PM    Provider Attestation:  If a scribe was utilized in generation of this patient record, I personally performed the services described in the documentation, reviewed the documentation, as recorded by the scribe in my presence, and it accurately records the patient's history of presenting illness, review of systems, patient physical examination, and procedures performed by me as the attending physician. Yosi Mayorga M.D.   [facility] Board Certified Emergency Physician  8/22/2017.  10:30 PM

## 2017-08-23 NOTE — DISCHARGE INSTRUCTIONS
SPECIFIC PATIENT INSTRUCTIONS FROM THE PHYSICIAN WHO TREATED YOU IN THE ER TODAY:  1. Return if any concerns or worsening of condition(s)  2. Prednisone as prescribed until finished. 3. Use your albuterol inhaler, if prescribed, and/or home nebulizer machine (if you have one) for wheezing. 4. FOLLOW UP APPOINTMENT:  Your primary doctor in 1-2 days at the [place] for further outpatient management including pulmonary function test and being seen by a pulmonologist.             Asthma Attack: Care Instructions  Your Care Instructions    During an asthma attack, the airways swell and narrow. This makes it hard to breathe. Severe asthma attacks can be life-threatening, but you can help prevent them by keeping your asthma under control and treating symptoms before they get bad. Symptoms include being short of breath, having chest tightness, coughing, and wheezing. Noting and treating these symptoms can also help you avoid future trips to the emergency room. The doctor has checked you carefully, but problems can develop later. If you notice any problems or new symptoms, get medical treatment right away. Follow-up care is a key part of your treatment and safety. Be sure to make and go to all appointments, and call your doctor if you are having problems. It's also a good idea to know your test results and keep a list of the medicines you take. How can you care for yourself at home? · Follow your asthma action plan to prevent and treat attacks. If you don't have an asthma action plan, work with your doctor to create one. · Take your asthma medicines exactly as prescribed. Talk to your doctor right away if you have any questions about how to take them. ¨ Use your quick-relief medicine when you have symptoms of an attack. Quick-relief medicine is usually an albuterol inhaler. Some people need to use quick-relief medicine before they exercise. ¨ Take your controller medicine every day, not just when you have symptoms. Controller medicine is usually an inhaled corticosteroid. The goal is to prevent problems before they occur. Don't use your controller medicine to treat an attack that has already started. It doesn't work fast enough to help. ¨ If your doctor prescribed corticosteroid pills to use during an attack, take them exactly as prescribed. It may take hours for the pills to work, but they may make the episode shorter and help you breathe better. ¨ Keep your quick-relief medicine with you at all times. · Talk to your doctor before using other medicines. Some medicines, such as aspirin, can cause asthma attacks in some people. · If you have a peak flow meter, use it to check how well you are breathing. This can help you predict when an asthma attack is going to occur. Then you can take medicine to prevent the asthma attack or make it less severe. · Do not smoke or allow others to smoke around you. Avoid smoky places. Smoking makes asthma worse. If you need help quitting, talk to your doctor about stop-smoking programs and medicines. These can increase your chances of quitting for good. · Learn what triggers an asthma attack for you, and avoid the triggers when you can. Common triggers include colds, smoke, air pollution, dust, pollen, mold, pets, cockroaches, stress, and cold air. · Avoid colds and the flu. Get a pneumococcal vaccine shot. If you have had one before, ask your doctor if you need a second dose. Get a flu vaccine every fall. If you must be around people with colds or the flu, wash your hands often. When should you call for help? Call 911 anytime you think you may need emergency care. For example, call if:  · You have severe trouble breathing. Call your doctor now or seek immediate medical care if:  · Your symptoms do not get better after you have followed your asthma action plan. · You have new or worse trouble breathing. · Your coughing and wheezing get worse.   · You cough up dark brown or bloody mucus (sputum). · You have a new or higher fever. Watch closely for changes in your health, and be sure to contact your doctor if:  · You need to use quick-relief medicine on more than 2 days a week (unless it is just for exercise). · You cough more deeply or more often, especially if you notice more mucus or a change in the color of your mucus. · You are not getting better as expected. Where can you learn more? Go to http://damien-kalyan.info/. Enter N914 in the search box to learn more about \"Asthma Attack: Care Instructions. \"  Current as of: March 25, 2017  Content Version: 11.3  © 6259-1043 [employer]. Care instructions adapted under license by [employer] (which disclaims liability or warranty for this information). If you have questions about a medical condition or this instruction, always ask your healthcare professional. [place] [address] any warranty or liability for your use of this information. Learning About Asthma Triggers  What are triggers? When you have asthma, certain things can make your symptoms worse. These are called triggers. They include:  · Cigarette smoke or air pollution. · Things you are allergic to, such as:  ¨ Pollen, mold, or dust mites. ¨ Pet hair, skin, or saliva. · Illnesses, like colds, flu, or pneumonia. · Exercise. · Dry, cold air. How do triggers affect asthma? Triggers can make it harder for your lungs to work as they should and can lead to sudden difficulty breathing and other symptoms. When you are around a trigger, an asthma attack is more likely. If your symptoms are severe, you may need emergency treatment or have to go to the hospital for treatment. If you know what your triggers are and can avoid them, you may be able to prevent asthma attacks, reduce how often you have them, and make them less severe. What can you do to avoid triggers? The first thing is to know your triggers.   When you are having symptoms, note the things around you that might be causing them. Then look for patterns in what may be triggering your symptoms. Record your triggers on a piece of paper or in an asthma diary. When you have your list of possible triggers, work with your doctor to find ways to avoid them. You also can check how well your lungs are working by measuring your peak expiratory flow (PEF) throughout the day. Your PEF may drop when you are near things that trigger symptoms. Here are some ways to avoid a few common triggers. · Do not smoke or allow others to smoke around you. If you need help quitting, talk to your doctor about stop-smoking programs and medicines. These can increase your chances of quitting for good. · If there is a lot of pollution, pollen, or dust outside, stay at home and keep your windows closed. Use an air conditioner or air filter in your home. Check your local weather report or newspaper for air quality and pollen reports. · Get a flu shot every year. Talk to your doctor about getting a pneumococcal shot. Wash your hands often to prevent infections. · Avoid exercising outdoors in cold weather. If you are outdoors in cold weather, wear a scarf around your face and breathe through your nose. How can you manage an asthma attack? · If you have an asthma action plan, follow the plan. In general:  ¨ Use your quick-relief inhaler as directed by your doctor. If your symptoms do not get better after you use your medicine, have someone take you to the emergency room. Call an ambulance if needed. ¨ If your doctor has given you other inhaled medicines or steroid pills, take them as directed. Where can you learn more? Go to MONOCO.be  Enter M564 in the search box to learn more about \"Learning About Asthma Triggers. \"   © 9550-3899 Healthwise, Incorporated.  Care instructions adapted under license by [facility] (which disclaims liability or warranty for this information). This care instruction is for use with your licensed healthcare professional. If you have questions about a medical condition or this instruction, always ask your healthcare professional. [address] any warranty or liability for your use of this information. Content Version: 50.1.847359; Last Revised: February 23, 2012          Asthma Action Plan: After Your Visit  Your Care Instructions  An asthma action plan is based on peak flow and asthma symptoms. Sorting symptoms and peak flow into red, yellow, and green \"zones\" can help you know how bad your asthma is and what actions you should take. Work with your doctor to make your plan. An action plan may include:  · The peak flow readings and symptoms for each zone. · What medicines to take in each zone. · When to call a doctor. · A list of emergency contact numbers. · A list of your asthma triggers. Follow-up care is a key part of your treatment and safety. Be sure to make and go to all appointments, and call your doctor if you are having problems. It's also a good idea to know your test results and keep a list of the medicines you take. How can you care for yourself at home? · Take your daily medicines to help minimize long-term damage and avoid asthma attacks. · Check your peak flow every morning and evening. This is the best way to know how well your lungs are working. · Check your action plan to see what zone you are in.  ¨ If you are in the green zone, keep taking your daily asthma medicines as prescribed. ¨ If you are in the yellow zone, you may be having or will soon have an asthma attack. You may not have any symptoms, but your lungs are not working as well as they should. Take the medicines listed in your action plan. If you stay in the yellow zone, your doctor may need to increase the dose or add a medicine. ¨ If you are in the red zone, follow your action plan.  If your symptoms or peak flow don't improve soon, you may need to go to the emergency room or be admitted to the hospital.  · Use an asthma diary. Write down your peak flow readings in the asthma diary. If you have an attack, write down what caused it (if you know), the symptoms, and what medicine you took. · Make sure you know how and when to call your doctor or go to the hospital.  · Take both the asthma action plan and the asthma diary--along with your peak flow meter and medicines--when you see your doctor. Tell your doctor if you are having trouble following your action plan. When should you call for help? Call 911 anytime you think you may need emergency care. For example, call if:  · You have severe trouble breathing. Call your doctor now or seek immediate medical care if:  · Your symptoms do not get better after you have followed your asthma action plan. · You cough up yellow, dark brown, or bloody mucus (sputum). Watch closely for changes in your health, and be sure to contact your doctor if:  · Your coughing and wheezing get worse. · You need to use quick-relief medicine on more than 2 days a week (unless it is just for exercise). · You need help figuring out what is triggering your asthma attacks. Where can you learn more? Go to Okeo.be  Enter B511 in the search box to learn more about \"Asthma Action Plan: After Your Visit. \"   © 9411-3002 Healthwise, Incorporated. Care instructions adapted under license by [facility] (which disclaims liability or warranty for this information). This care instruction is for use with your licensed healthcare professional. If you have questions about a medical condition or this instruction, always ask your healthcare professional. [address] any warranty or liability for your use of this information. Content Version: 60.0.350025; Last Revised: March 9, 2012      [employer] Activation    Thank you for requesting access to [employer].  Please follow the instructions below to securely access and download your online medical record. [employer] allows you to send messages to your doctor, view your test results, renew your prescriptions, schedule appointments, and more. How Do I Sign Up? 1. In your internet browser, go to [email or web address]. 2. Click on the First Time User? Click Here link in the Sign In box. You will see the New Member Sign Up page. 3. Enter your [employer] Access Code exactly as it appears below. You will not need to use this code after youve completed the sign-up process. If you do not sign up before the expiration date, you must request a new code. [employer] Access Code: Activation code not generated  Current [employer] Status: Active (This is the date your [employer] access code will )    4. Enter the last four digits of your Social Security Number (xxxx) and Date of Birth (mm/dd/yyyy) as indicated and click Submit. You will be taken to the next sign-up page. 5. Create a [employer] ID. This will be your [employer] login ID and cannot be changed, so think of one that is secure and easy to remember. 6. Create a [employer] password. You can change your password at any time. 7. Enter your Password Reset Question and Answer. This can be used at a later time if you forget your password. 8. Enter your e-mail address. You will receive e-mail notification when new information is available in [address] 9. Click Sign Up. You can now view and download portions of your medical record. 10. Click the Download Summary menu link to download a portable copy of your medical information. Additional Information    If you have questions, please visit the Frequently Asked Questions section of the [employer] website at [email or web address] Remember, [employer] is NOT to be used for urgent needs. For medical emergencies, dial 911.

## 2017-08-23 NOTE — ED NOTES
Wheezing insp/exp all fields. 91% on RA    I performed a brief evaluation, including history and physical, of the patient here in triage and I have determined that pt will need further treatment and evaluation from the main side ER physician. I have placed initial orders to help in expediting patients care.      August 22, 2017 at 9:44 PM - Leslie Bullock MD        Visit Vitals    /78 (BP 1 Location: Right arm, BP Patient Position: At rest)    Pulse (!) 103    Temp 98.4 °F (36.9 °C)    Resp 30    Ht 5' 11\" (1.803 m)    Wt 95.3 kg (210 lb)    SpO2 91%    BMI 29.29 kg/m2

## 2017-08-23 NOTE — ED NOTES
Pt completed three treatments and states he feels better. Scattered wheezing on auscultation but no longer audible by naked ear.

## 2017-09-12 ENCOUNTER — LAB ONLY (OUTPATIENT)
Dept: INTERNAL MEDICINE CLINIC | Age: 72
End: 2017-09-12

## 2017-09-18 ENCOUNTER — HOSPITAL ENCOUNTER (OUTPATIENT)
Dept: LAB | Age: 72
Discharge: HOME | End: 2017-09-18
Payer: COMMERCIAL

## 2017-09-18 LAB
BASOPHILS # BLD: 0 K/UL (ref 0–0.06)
BASOPHILS NFR BLD: 1 % (ref 0–2)
DIFFERENTIAL METHOD BLD: ABNORMAL
EOSINOPHIL # BLD: 0.4 K/UL (ref 0–0.4)
EOSINOPHIL NFR BLD: 6 % (ref 0–5)
ERYTHROCYTE [DISTWIDTH] IN BLOOD BY AUTOMATED COUNT: 14 % (ref 11.6–14.5)
HCT VFR BLD AUTO: 43.4 % (ref 36–48)
HGB BLD-MCNC: 14.3 G/DL (ref 13–16)
LYMPHOCYTES # BLD: 2 K/UL (ref 0.9–3.6)
LYMPHOCYTES NFR BLD: 28 % (ref 21–52)
MCH RBC QN AUTO: 32 PG (ref 24–34)
MCHC RBC AUTO-ENTMCNC: 32.9 G/DL (ref 31–37)
MCV RBC AUTO: 97.1 FL (ref 74–97)
MONOCYTES # BLD: 0.6 K/UL (ref 0.05–1.2)
MONOCYTES NFR BLD: 8 % (ref 3–10)
NEUTS SEG # BLD: 4.2 K/UL (ref 1.8–8)
NEUTS SEG NFR BLD: 57 % (ref 40–73)
PLATELET # BLD AUTO: 270 K/UL (ref 135–420)
PMV BLD AUTO: 9.8 FL (ref 9.2–11.8)
RBC # BLD AUTO: 4.47 M/UL (ref 4.7–5.5)
WBC # BLD AUTO: 7.4 K/UL (ref 4.6–13.2)

## 2017-09-18 PROCEDURE — 85025 COMPLETE CBC W/AUTO DIFF WBC: CPT | Performed by: INTERNAL MEDICINE

## 2017-09-18 PROCEDURE — 82785 ASSAY OF IGE: CPT | Performed by: INTERNAL MEDICINE

## 2017-09-18 PROCEDURE — 36415 COLL VENOUS BLD VENIPUNCTURE: CPT | Performed by: INTERNAL MEDICINE

## 2017-09-18 PROCEDURE — 86003 ALLG SPEC IGE CRUDE XTRC EA: CPT | Performed by: INTERNAL MEDICINE

## 2017-09-19 LAB — IGE SERPL-ACNC: 195 IU/ML (ref 0–100)

## 2017-09-20 LAB
A ALTERNATA IGE QN: <0.1 KU/L
A FUMIGATUS IGE QN: <0.1 KU/L
AMER ROACH IGE QN: <0.1 KU/L
AMER SYCAMORE IGE QN: <0.1 KU/L
BAHIA GRASS IGE QN: <0.1 KU/L
BERMUDA GRASS IGE QN: <0.1 KU/L
BOXELDER IGE QN: <0.1 KU/L
C HERBARUM IGE QN: <0.1 KU/L
CAT DANDER IGG QN: <0.1 KU/L
CLASS DESCRIPTION, 600268: ABNORMAL
COMMON RAGWEED IGE QN: <0.1 KU/L
D FARINAE IGE QN: 5.72 KU/L
D PTERONYSS IGE QN: 11.8 KU/L
DEPRECATED IGE QN: <0.1 KU/L
DOG DANDER IGE QN: <0.1 KU/L
ENGL PLANTAIN IGE QN: <0.1 KU/L
JOHNSON GRASS IGE QN: <0.1 KU/L
M RACEMOSUS IGE QN: <0.1 KU/L
MT JUNIPER IGE QN: <0.1 KU/L
MUGWORT IGE QN: <0.1 KU/L
NETTLE IGE QN: <0.1 KU/L
P NOTATUM IGE QN: <0.1 KU/L
S BOTRYOSUM IGE QN: <0.1 KU/L
SHEEP SORREL IGE QN: <0.1 KU/L
SWEET GUM IGE QN: <0.1 KU/L
TIMOTHY IGE QN: <0.1 KU/L
WHITE BIRCH IGE QN: <0.1 KU/L
WHITE ELM IGG QN: <0.1 KU/L
WHITE HICKORY IGE QN: <0.1 KU/L
WHITE MULBERRY IGE QN: <0.1 KU/L
WHITE OAK IGE QN: <0.1 KU/L

## 2017-12-11 ENCOUNTER — OFFICE VISIT (OUTPATIENT)
Dept: INTERNAL MEDICINE CLINIC | Age: 72
End: 2017-12-11

## 2017-12-11 ENCOUNTER — HOSPITAL ENCOUNTER (OUTPATIENT)
Dept: LAB | Age: 72
Discharge: HOME | End: 2017-12-11
Payer: COMMERCIAL

## 2017-12-11 VITALS
DIASTOLIC BLOOD PRESSURE: 86 MMHG | WEIGHT: 223 LBS | RESPIRATION RATE: 18 BRPM | BODY MASS INDEX: 31.22 KG/M2 | SYSTOLIC BLOOD PRESSURE: 128 MMHG | HEIGHT: 71 IN | TEMPERATURE: 98.6 F | OXYGEN SATURATION: 96 % | HEART RATE: 79 BPM

## 2017-12-11 DIAGNOSIS — E78.5 DYSLIPIDEMIA: Primary | ICD-10-CM

## 2017-12-11 DIAGNOSIS — Z76.0 MEDICATION REFILL: ICD-10-CM

## 2017-12-11 DIAGNOSIS — Z23 ENCOUNTER FOR IMMUNIZATION: ICD-10-CM

## 2017-12-11 DIAGNOSIS — R73.9 ELEVATED BLOOD SUGAR: ICD-10-CM

## 2017-12-11 DIAGNOSIS — F19.939 WITHDRAWAL FROM OTHER PSYCHOACTIVE SUBSTANCE (HCC): ICD-10-CM

## 2017-12-11 DIAGNOSIS — E78.5 DYSLIPIDEMIA: ICD-10-CM

## 2017-12-11 DIAGNOSIS — F41.9 ANXIETY: ICD-10-CM

## 2017-12-11 LAB
ANION GAP SERPL CALC-SCNC: 11 MMOL/L (ref 3–18)
BUN SERPL-MCNC: 18 MG/DL (ref 7–18)
BUN/CREAT SERPL: 25 (ref 12–20)
CALCIUM SERPL-MCNC: 9.1 MG/DL (ref 8.5–10.1)
CHLORIDE SERPL-SCNC: 103 MMOL/L (ref 100–108)
CHOLEST SERPL-MCNC: 195 MG/DL
CO2 SERPL-SCNC: 26 MMOL/L (ref 21–32)
CREAT SERPL-MCNC: 0.71 MG/DL (ref 0.6–1.3)
GLUCOSE SERPL-MCNC: 92 MG/DL (ref 74–99)
HBA1C MFR BLD: 6.1 % (ref 4.2–5.6)
HDLC SERPL-MCNC: 76 MG/DL (ref 40–60)
HDLC SERPL: 2.6 {RATIO} (ref 0–5)
LDLC SERPL CALC-MCNC: 99.8 MG/DL (ref 0–100)
LIPID PROFILE,FLP: ABNORMAL
POTASSIUM SERPL-SCNC: 4.1 MMOL/L (ref 3.5–5.5)
SODIUM SERPL-SCNC: 140 MMOL/L (ref 136–145)
TRIGL SERPL-MCNC: 96 MG/DL (ref ?–150)
VLDLC SERPL CALC-MCNC: 19.2 MG/DL

## 2017-12-11 PROCEDURE — 83036 HEMOGLOBIN GLYCOSYLATED A1C: CPT | Performed by: INTERNAL MEDICINE

## 2017-12-11 PROCEDURE — 36415 COLL VENOUS BLD VENIPUNCTURE: CPT | Performed by: INTERNAL MEDICINE

## 2017-12-11 PROCEDURE — 80048 BASIC METABOLIC PNL TOTAL CA: CPT | Performed by: INTERNAL MEDICINE

## 2017-12-11 PROCEDURE — 80061 LIPID PANEL: CPT | Performed by: INTERNAL MEDICINE

## 2017-12-11 RX ORDER — FLUTICASONE FUROATE AND VILANTEROL TRIFENATATE 200; 25 UG/1; UG/1
POWDER RESPIRATORY (INHALATION)
Refills: 2 | COMMUNITY
Start: 2017-12-04 | End: 2020-02-25 | Stop reason: SDUPTHER

## 2017-12-11 RX ORDER — ALPRAZOLAM 0.5 MG/1
0.5 TABLET ORAL
Qty: 45 TAB | Refills: 5 | Status: SHIPPED | OUTPATIENT
Start: 2017-12-11 | End: 2018-01-26 | Stop reason: SDUPTHER

## 2017-12-11 NOTE — PROGRESS NOTES
HISTORY OF PRESENT ILLNESS  Diamante Leigh is a 71 y.o. male. Visit Vitals    /86 (BP 1 Location: Left arm, BP Patient Position: Sitting)    Pulse 79    Temp 98.6 °F (37 °C) (Oral)    Resp 18    Ht 5' 11\" (1.803 m)    Wt 223 lb (101.2 kg)    SpO2 96%    BMI 31.1 kg/m2       Cholesterol Problem   The history is provided by the patient. This is a chronic problem. The current episode started more than 1 week ago. The problem occurs daily. The problem has not changed since onset. Anxiety   The history is provided by the patient. This is a chronic problem. The current episode started more than 1 week ago. The problem occurs daily. The problem has been gradually improving. The symptoms are aggravated by stress (job stress. ). The symptoms are relieved by medications. Review of Systems   Constitutional: Negative. Respiratory: Negative. Cardiovascular: Negative. Neurological: Negative. Physical Exam   Constitutional: He is oriented to person, place, and time. He appears well-developed and well-nourished. No distress. Cardiovascular: Normal rate and regular rhythm. Pulmonary/Chest: Effort normal and breath sounds normal.   Musculoskeletal: He exhibits no edema. Neurological: He is alert and oriented to person, place, and time. Skin: Skin is warm and dry. He is not diaphoretic. Psychiatric: He has a normal mood and affect. Nursing note and vitals reviewed. ASSESSMENT and PLAN    ICD-10-CM ICD-9-CM    1. Dyslipidemia E78.5 272.4 LIPID PANEL   2. Anxiety F41.9 300.00    3. Elevated blood sugar C23.4 122.49 METABOLIC PANEL, BASIC      HEMOGLOBIN A1C W/O EAG   4. Medication refill Z76.0 V68.1 ALPRAZolam (XANAX) 0.5 mg tablet   5. Encounter for immunization Z23 V03.89 INFLUENZA VIRUS VACCINE, HIGH DOSE SEASONAL, PRESERVATIVE FREE       Doing well overall.   Update lab    Recheck blood sugar as once elevated    Discussed BMI/weight, lifestyle, diet and exercise    F/u 6 months for recheck

## 2017-12-11 NOTE — PROGRESS NOTES
Jarad Garcia presents today for cholesterol anxiety. Jarad Garcia preferred language for health care discussion is english/other. Is someone accompanying this pt? No    Is the patient using any DME equipment during OV? No    Depression Screening:  PHQ over the last two weeks 12/11/2017 7/25/2017 10/22/2016 10/15/2015   PHQ Not Done - Active Diagnosis of Depression or Bipolar Disorder - -   Little interest or pleasure in doing things Not at all Not at all Several days Not at all   Feeling down, depressed or hopeless Not at all Not at all Several days Not at all   Total Score PHQ 2 0 0 2 0       Learning Assessment:  Learning Assessment 11/11/2016 12/16/2013   PRIMARY LEARNER Patient Patient   PRIMARY LANGUAGE ENGLISH ENGLISH   LEARNER PREFERENCE PRIMARY DEMONSTRATION LISTENING   ANSWERED BY Patient patient   RELATIONSHIP SELF SELF       Abuse Screening:  No flowsheet data found. Fall Risk  Fall Risk Assessment, last 12 mths 12/11/2017 7/25/2017 11/11/2016 10/22/2016 10/15/2015   Able to walk? Yes Yes Yes Yes Yes   Fall in past 12 months? No No Yes Yes No   Fall with injury? - - Yes No -   Number of falls in past 12 months - - 2 - -   Fall Risk Score - - 3 - -       Health Maintenance reviewed and discussed per provider. Yes    Jarad Garcia is due for influenza and eye. Please order/place referral if appropriate. Advance Directive:  1. Do you have an advance directive in place? Patient Reply: No    2. If not, would you like material regarding how to put one in place? Patient Reply: No    Coordination of Care:  1. Have you been to the ER, urgent care clinic since your last visit? Hospitalized since your last visit? Yes;  [facility] for asmtha 08/2017    2. Have you seen or consulted any other health care providers outside of the [address] [place] [address] since your last visit?   Pulmonary ( pt stated he does not remember the name)  And Dr Misha Ricardo ( accupunture) and Dr Monica Carl ( therapist for anger management)

## 2017-12-11 NOTE — PROGRESS NOTES
Presented for high dose Influenza Vaccine. Administered in right deltoid without complaints. Pt observed for 5 min. No adverse reaction noted.  Pt left office in stable condition

## 2017-12-11 NOTE — MR AVS SNAPSHOT
Visit Information Date & Time Provider Department Dept. Phone Encounter #  
 12/11/2017  2:45 PM Tan Helton, [facility] [phone] [id number] Follow-up Instructions Return in about 6 months (around 6/11/2018) for cholesterol, anxiety, annual CPE. Your Appointments 1/25/2018  9:00 AM  
PHYSICAL with Tan Helton MD  
[facility]) Appt Note: 6 month f/u Annual CPE; Chol  
 [address]  
  
   
 [address] [facility] Upcoming Health Maintenance Date Due  
 GLAUCOMA SCREENING Q2Y 8/29/2011 Influenza Age 5 to Adult 8/1/2017 ZOSTER VACCINE AGE 60> 7/15/2019* Pneumococcal 65+ Low/Medium Risk (2 of 2 - PPSV23) 7/25/2018 MEDICARE YEARLY EXAM 12/12/2018 DTaP/Tdap/Td series (2 - Td) 12/16/2023 COLONOSCOPY 10/18/2026 *Topic was postponed. The date shown is not the original due date. Allergies as of 12/11/2017  Review Complete On: 12/11/2017 By: Tan Helton MD  
 No Known Allergies Current Immunizations  Reviewed on 10/28/2016 Name Date Influenza High Dose Vaccine PF  Incomplete, 10/28/2016 12:22 PM, 10/15/2015 Influenza Vaccine PF 9/26/2014 Pneumococcal Conjugate (PCV-13) 7/25/2017 12:39 PM  
 Tdap 12/16/2013 11:16 AM  
  
 Not reviewed this visit You Were Diagnosed With   
  
 Codes Comments Dyslipidemia    -  Primary ICD-10-CM: E78.5 ICD-9-CM: 272.4 Anxiety     ICD-10-CM: F41.9 ICD-9-CM: 300.00 Elevated blood sugar     ICD-10-CM: R73.9 ICD-9-CM: 790.29 Medication refill     ICD-10-CM: Z76.0 ICD-9-CM: V68.1 Encounter for immunization     ICD-10-CM: U06 ICD-9-CM: V03.89 Vitals BP Pulse Temp Resp Height(growth percentile) Weight(growth percentile)  128/86 (BP 1 Location: Left arm, BP Patient Position: Sitting) 79 98.6 °F (37 °C) (Oral) 18 5' 11\" (1.803 m) 223 lb (101.2 kg) SpO2 BMI Smoking Status 96% 31.1 kg/m2 Former Smoker Vitals History BMI and BSA Data Body Mass Index Body Surface Area  
 31.1 kg/m 2 2.25 m 2 Preferred Pharmacy Pharmacy Name Phone [facility] [address] [facility]. [phone] Your Updated Medication List  
  
   
This list is accurate as of: 12/11/17  3:50 PM.  Always use your most recent med list.  
  
  
  
  
 * albuterol 90 mcg/actuation inhaler Commonly known as:  PROVENTIL HFA Take 2 Puffs by inhalation every six (6) hours as needed for Wheezing. * albuterol 90 mcg/actuation inhaler Commonly known as:  PROVENTIL HFA, VENTOLIN HFA, PROAIR HFA Take 2 Puffs by inhalation every four (4) hours as needed for Wheezing. ALPRAZolam 0.5 mg tablet Commonly known as:  Corinne Reasnor Take 1 Tab by mouth three (3) times daily as needed for Anxiety. .  
  
 ASPIR-81 PO Take  by mouth. BREO ELLIPTA 200-25 mcg/dose inhaler Generic drug:  fluticasone-vilanterol  
  
 diclofenac EC 50 mg EC tablet Commonly known as:  VOLTAREN Take one po tid prn pain  
  
 inhalational spacing device 1 Each by Does Not Apply route as needed. simvastatin 40 mg tablet Commonly known as:  ZOCOR Take 1 Tab by mouth daily. Indications: hypercholesterolemia  
  
 venlafaxine 75 mg tablet Commonly known as:  [facility] Take 1 Tab by mouth three (3) times daily. * Notice: This list has 2 medication(s) that are the same as other medications prescribed for you. Read the directions carefully, and ask your doctor or other care provider to review them with you. Prescriptions Printed Refills ALPRAZolam (XANAX) 0.5 mg tablet 5 Sig: Take 1 Tab by mouth three (3) times daily as needed for Anxiety. .  
 Class: Print Route: Oral  
  
We Performed the Following INFLUENZA VIRUS VACCINE, HIGH DOSE SEASONAL, PRESERVATIVE FREE [88620 CPT(R)] Follow-up Instructions Return in about 6 months (around 6/11/2018) for cholesterol, anxiety, annual CPE. To-Do List   
 12/11/2017 Lab:  HEMOGLOBIN A1C W/O EAG   
  
 12/11/2017 Lab:  LIPID PANEL   
  
 12/11/2017 Lab:  METABOLIC PANEL, BASIC Introducing \[facility] [place] [facility]\"" & HEALTH SERVICES! Dear Tiffanie Vela: Thank you for requesting a InfluAds account. Our records indicate that you already have an active InfluAds account. You can access your account anytime at [email or web address] Did you know that you can access your hospital and ER discharge instructions at any time in InfluAds? You can also review all of your test results from your hospital stay or ER visit. Additional Information If you have questions, please visit the Frequently Asked Questions section of the InfluAds website at [email or web address] Remember, InfluAds is NOT to be used for urgent needs. For medical emergencies, dial 911. Now available from your iPhone and Android! Please provide this summary of care documentation to your next provider. Your primary care clinician is listed as [facility]. If you have any questions after today's visit, please call [phone].

## 2018-01-26 DIAGNOSIS — Z76.0 MEDICATION REFILL: ICD-10-CM

## 2018-01-26 RX ORDER — ALPRAZOLAM 0.5 MG/1
TABLET ORAL
Qty: 45 TAB | Refills: 2 | Status: SHIPPED | OUTPATIENT
Start: 2018-01-26 | End: 2018-12-11 | Stop reason: SDUPTHER

## 2018-06-11 ENCOUNTER — OFFICE VISIT (OUTPATIENT)
Dept: INTERNAL MEDICINE CLINIC | Age: 73
End: 2018-06-11

## 2018-06-11 ENCOUNTER — HOSPITAL ENCOUNTER (OUTPATIENT)
Dept: LAB | Age: 73
Discharge: HOME | End: 2018-06-11
Payer: COMMERCIAL

## 2018-06-11 VITALS
WEIGHT: 215.8 LBS | RESPIRATION RATE: 16 BRPM | HEIGHT: 71 IN | HEART RATE: 68 BPM | DIASTOLIC BLOOD PRESSURE: 72 MMHG | OXYGEN SATURATION: 96 % | TEMPERATURE: 98.5 F | BODY MASS INDEX: 30.21 KG/M2 | SYSTOLIC BLOOD PRESSURE: 116 MMHG

## 2018-06-11 DIAGNOSIS — R73.9 ELEVATED BLOOD SUGAR: ICD-10-CM

## 2018-06-11 DIAGNOSIS — E78.5 DYSLIPIDEMIA: ICD-10-CM

## 2018-06-11 DIAGNOSIS — F41.9 ANXIETY: ICD-10-CM

## 2018-06-11 DIAGNOSIS — Z76.0 MEDICATION REFILL: ICD-10-CM

## 2018-06-11 DIAGNOSIS — E78.5 DYSLIPIDEMIA: Primary | ICD-10-CM

## 2018-06-11 LAB
ALBUMIN SERPL-MCNC: 4.2 G/DL (ref 3.4–5)
ALBUMIN/GLOB SERPL: 1.5 {RATIO} (ref 0.8–1.7)
ALP SERPL-CCNC: 69 U/L (ref 45–117)
ALT SERPL-CCNC: 28 U/L (ref 16–61)
ANION GAP SERPL CALC-SCNC: 8 MMOL/L (ref 3–18)
AST SERPL-CCNC: 17 U/L (ref 15–37)
BILIRUB SERPL-MCNC: 0.3 MG/DL (ref 0.2–1)
BUN SERPL-MCNC: 23 MG/DL (ref 7–18)
BUN/CREAT SERPL: 29 (ref 12–20)
CALCIUM SERPL-MCNC: 8.4 MG/DL (ref 8.5–10.1)
CHLORIDE SERPL-SCNC: 108 MMOL/L (ref 100–108)
CHOLEST SERPL-MCNC: 186 MG/DL
CO2 SERPL-SCNC: 28 MMOL/L (ref 21–32)
CREAT SERPL-MCNC: 0.8 MG/DL (ref 0.6–1.3)
GLOBULIN SER CALC-MCNC: 2.8 G/DL (ref 2–4)
GLUCOSE SERPL-MCNC: 91 MG/DL (ref 74–99)
HDLC SERPL-MCNC: 55 MG/DL (ref 40–60)
HDLC SERPL: 3.4 {RATIO} (ref 0–5)
LDLC SERPL CALC-MCNC: 108.8 MG/DL (ref 0–100)
LIPID PROFILE,FLP: ABNORMAL
POTASSIUM SERPL-SCNC: 4.4 MMOL/L (ref 3.5–5.5)
PROT SERPL-MCNC: 7 G/DL (ref 6.4–8.2)
SODIUM SERPL-SCNC: 144 MMOL/L (ref 136–145)
T4 FREE SERPL-MCNC: 1 NG/DL (ref 0.7–1.5)
TRIGL SERPL-MCNC: 111 MG/DL (ref ?–150)
TSH SERPL DL<=0.05 MIU/L-ACNC: 2.36 UIU/ML (ref 0.36–3.74)
VLDLC SERPL CALC-MCNC: 22.2 MG/DL

## 2018-06-11 PROCEDURE — 80053 COMPREHEN METABOLIC PANEL: CPT | Performed by: INTERNAL MEDICINE

## 2018-06-11 PROCEDURE — 36415 COLL VENOUS BLD VENIPUNCTURE: CPT | Performed by: INTERNAL MEDICINE

## 2018-06-11 PROCEDURE — 83036 HEMOGLOBIN GLYCOSYLATED A1C: CPT | Performed by: INTERNAL MEDICINE

## 2018-06-11 PROCEDURE — 84443 ASSAY THYROID STIM HORMONE: CPT | Performed by: INTERNAL MEDICINE

## 2018-06-11 PROCEDURE — 80061 LIPID PANEL: CPT | Performed by: INTERNAL MEDICINE

## 2018-06-11 RX ORDER — SIMVASTATIN 40 MG/1
40 TABLET, FILM COATED ORAL DAILY
Qty: 90 TAB | Refills: 5 | Status: SHIPPED | OUTPATIENT
Start: 2018-06-11 | End: 2018-12-11 | Stop reason: SDUPTHER

## 2018-06-11 RX ORDER — AA/PROT/LYSINE/METHIO/VIT C/B6 50-12.5 MG
TABLET ORAL DAILY
COMMUNITY

## 2018-06-11 NOTE — PROGRESS NOTES
Ranulfo Hood presents today for   Chief Complaint   Patient presents with    Complete Physical    Cholesterol Problem     6 month f/u    Anxiety     6 month f/u    Medication Refill       Ranulfo Hood preferred language for health care discussion is english/other. Is someone accompanying this pt? no    Is the patient using any DME equipment during OV? no    Depression Screening:  PHQ over the last two weeks 6/11/2018 12/11/2017 7/25/2017 10/22/2016 10/15/2015   PHQ Not Done - - Active Diagnosis of Depression or Bipolar Disorder - -   Little interest or pleasure in doing things Not at all Not at all Not at all Several days Not at all   Feeling down, depressed or hopeless Not at all Not at all Not at all Several days Not at all   Total Score PHQ 2 0 0 0 2 0       Learning Assessment:  Learning Assessment 11/11/2016 12/16/2013   PRIMARY LEARNER Patient Patient   PRIMARY LANGUAGE ENGLISH ENGLISH   LEARNER PREFERENCE PRIMARY DEMONSTRATION LISTENING   ANSWERED BY Patient patient   RELATIONSHIP SELF SELF       Abuse Screening:  No flowsheet data found. Fall Risk  Fall Risk Assessment, last 12 mths 6/11/2018 12/11/2017 7/25/2017 11/11/2016 10/22/2016 10/15/2015   Able to walk? Yes Yes Yes Yes Yes Yes   Fall in past 12 months? No No No Yes Yes No   Fall with injury? - - - Yes No -   Number of falls in past 12 months - - - 2 - -   Fall Risk Score - - - 3 - -       Health Maintenance reviewed and discussed per provider. Yes    Ranulfo Hood is due for   Health Maintenance Due   Topic Date Due    GLAUCOMA SCREENING Q2Y  08/29/2011   Pt. Has not had an eye exam for 2 yrs. Needs referral  Please order/place referral if appropriate. Advance Directive:  1. Do you have an advance directive in place? Patient Reply: no    2. If not, would you like material regarding how to put one in place? Patient Reply: no    Coordination of Care:  1. Have you been to the ER, urgent care clinic since your last visit?   Hospitalized since your last visit? no    2. Have you seen or consulted any other health care providers outside of the [facility] since your last visit? Include any pap smears or colon screening.  no

## 2018-06-11 NOTE — MR AVS SNAPSHOT
[address] [place] [address] 
 
 
 [address] [facility] [address] [id number] 
[phone] Patient: Beatris Solomon MRN: [id number] Visit Information Date & Time Provider Department Dept. Phone Encounter #  
 6/11/2018  1:45 PM [phone] Follow-up Instructions Return in about 6 months (around 12/11/2018) for htn, cholesterol, blood sugar check. Upcoming Health Maintenance Date Due  
 GLAUCOMA SCREENING Q2Y 8/29/2011 ZOSTER VACCINE AGE 60> 7/15/2019* Pneumococcal 65+ Low/Medium Risk (2 of 2 - PPSV23) 7/25/2018 Influenza Age 5 to Adult 8/1/2018 DTaP/Tdap/Td series (2 - Td) 12/16/2023 COLONOSCOPY 10/18/2026 *Topic was postponed. The date shown is not the original due date. Allergies as of 6/11/2018  Review Complete On: 6/11/2018 By: Niki Navarro MD  
 No Known Allergies Current Immunizations  Reviewed on 10/28/2016 Name Date Influenza High Dose Vaccine PF 12/11/2017, 10/28/2016 12:22 PM, 10/15/2015 Influenza Vaccine PF 9/26/2014 Pneumococcal Conjugate (PCV-13) 7/25/2017 12:39 PM  
 Tdap 12/16/2013 11:16 AM  
  
 Not reviewed this visit You Were Diagnosed With   
  
 Codes Comments Dyslipidemia    -  Primary ICD-10-CM: E78.5 ICD-9-CM: 272.4 Anxiety     ICD-10-CM: F41.9 ICD-9-CM: 300.00 Elevated blood sugar     ICD-10-CM: R73.9 ICD-9-CM: 790.29 Medication refill     ICD-10-CM: Z76.0 ICD-9-CM: V68.1 Vitals BP Pulse Temp Resp Height(growth percentile) Weight(growth percentile) 116/72 (BP 1 Location: Left arm, BP Patient Position: Sitting) 68 98.5 °F (36.9 °C) (Oral) 16 5' 11\" (1.803 m) 215 lb 12.8 oz (97.9 kg) SpO2 BMI Smoking Status 96% 30.1 kg/m2 Former Smoker Vitals History BMI and BSA Data Body Mass Index Body Surface Area  
 30.1 kg/m 2 2.21 m 2 Preferred Pharmacy Pharmacy Name Phone [facility] [address], [address] [facility]. [phone] Your Updated Medication List  
  
   
This list is accurate as of 6/11/18  2:43 PM.  Always use your most recent med list.  
  
  
  
  
 * albuterol 90 mcg/actuation inhaler Commonly known as:  PROVENTIL HFA Take 2 Puffs by inhalation every six (6) hours as needed for Wheezing. * albuterol 90 mcg/actuation inhaler Commonly known as:  PROVENTIL HFA, VENTOLIN HFA, PROAIR HFA Take 2 Puffs by inhalation every four (4) hours as needed for Wheezing. ALPRAZolam 0.5 mg tablet Commonly known as:  XANAX  
TAKE ONE TABLET THREE TIMES A DAY ASPIR-81 PO Take  by mouth. BREO ELLIPTA 200-25 mcg/dose inhaler Generic drug:  fluticasone-vilanterol CO Q-10 10 mg Cap Generic drug:  coenzyme q10 Take  by mouth daily. diclofenac EC 50 mg EC tablet Commonly known as:  VOLTAREN Take one po tid prn pain  
  
 inhalational spacing device 1 Each by Does Not Apply route as needed. simvastatin 40 mg tablet Commonly known as:  ZOCOR Take 1 Tab by mouth daily. Indications: hypercholesterolemia  
  
 venlafaxine 75 mg tablet Commonly known as:  [facility] Take 1 Tab by mouth three (3) times daily. * Notice: This list has 2 medication(s) that are the same as other medications prescribed for you. Read the directions carefully, and ask your doctor or other care provider to review them with you. Prescriptions Sent to Pharmacy Refills  
 simvastatin (ZOCOR) 40 mg tablet 5 Sig: Take 1 Tab by mouth daily. Indications: hypercholesterolemia Class: Normal  
 Pharmacy: [address] [facility], [address] [facility].  #: [phone] Route: Oral  
  
Follow-up Instructions Return in about 6 months (around 12/11/2018) for htn, cholesterol, blood sugar check. Introducing hospitals & HEALTH SERVICES! Dear Clay Essex: Thank you for requesting a Ohloh account. Our records indicate that you already have an active Ohloh account. You can access your account anytime at [email or web address]. [email or web address]/[email or web address] Sales Holding Did you know that you can access your hospital and ER discharge instructions at any time in Ohloh? You can also review all of your test results from your hospital stay or ER visit. Additional Information If you have questions, please visit the Frequently Asked Questions section of the Ohloh website at [email or web address] Remember, Ohloh is NOT to be used for urgent needs. For medical emergencies, dial 911. Now available from your iPhone and Android! Please provide this summary of care documentation to your next provider. Your primary care clinician is listed as [facility]. If you have any questions after today's visit, please call [phone].

## 2018-06-11 NOTE — PROGRESS NOTES
HISTORY OF PRESENT ILLNESS  Bran Feldman is a 71 y.o. male. 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 Still thinking about retiring from [employer]. Declines CPE today    Cholesterol Problem   The history is provided by the patient. This is a chronic problem. The current episode started more than 1 week ago. The problem occurs daily. The problem has not changed since onset. Pertinent negatives include no chest pain, no headaches and no shortness of breath. Exacerbated by: diet. Relieved by: diet. Treatments tried: statin. The treatment provided moderate relief. Anxiety   The history is provided by the patient. This is a chronic problem. The current episode started more than 1 week ago. The problem occurs daily. The problem has not changed since onset. Pertinent negatives include no chest pain, no headaches and no shortness of breath. The symptoms are aggravated by stress. Medication Refill   Pertinent negatives include no chest pain, no headaches and no shortness of breath. Review of Systems   Constitutional: Negative. Respiratory: Negative for shortness of breath. Cardiovascular: Negative for chest pain and palpitations. Neurological: Negative for dizziness and headaches. Physical Exam   Constitutional: He is oriented to person, place, and time. He appears well-developed and well-nourished. No distress.    HENT:   Right Ear: Tympanic membrane, external ear and ear canal normal.   Left Ear: Tympanic membrane, external ear and ear canal normal.   Mouth/Throat: Uvula is midline, oropharynx is clear and moist and mucous membranes are normal.   Eyes: Conjunctivae and EOM are normal.   Cardiovascular: Normal rate and regular rhythm. Pulmonary/Chest: Effort normal and breath sounds normal.   Musculoskeletal: He exhibits no edema. Neurological: He is alert and oriented to person, place, and time. Skin: Skin is warm and dry. He is not diaphoretic. Psychiatric: He has a normal mood and affect. Nursing note and vitals reviewed. ASSESSMENT and PLAN    ICD-10-CM ICD-9-CM    1. Dyslipidemia E78.5 272.4 coenzyme q10 (CO Q-10) 10 mg cap      simvastatin (ZOCOR) 40 mg tablet      LIPID PANEL      TSH AND FREE T4   2. Anxiety F41.9 300.00 TSH AND FREE T4   3. Elevated blood sugar B56.0 780.76 METABOLIC PANEL, COMPREHENSIVE      HEMOGLOBIN A1C W/O EAG      TSH AND FREE T4   4. Medication refill Z76.0 V68.1 coenzyme q10 (CO Q-10) 10 mg cap      simvastatin (ZOCOR) 40 mg tablet       Doing fairly well overall. Discussed BMI/weight, lifestyle, diet and exercise. Discussed effect on blood pressure, blood sugar, and joints especially  Focus on limiting white carbs, portion control, and moving more. pt advised to f/u with eye doctor.     Update lab    Refill as noted    F/u 6 months

## 2018-06-12 LAB — HBA1C MFR BLD: 6.1 % (ref 4.2–5.6)

## 2018-09-12 DIAGNOSIS — Z76.0 MEDICATION REFILL: ICD-10-CM

## 2018-09-12 RX ORDER — VENLAFAXINE 75 MG/1
TABLET ORAL
Qty: 270 TAB | Refills: 5 | Status: SHIPPED | OUTPATIENT
Start: 2018-09-12 | End: 2018-12-11

## 2018-12-11 ENCOUNTER — DOCUMENTATION ONLY (OUTPATIENT)
Dept: INTERNAL MEDICINE CLINIC | Age: 73
End: 2018-12-11

## 2018-12-11 ENCOUNTER — OFFICE VISIT (OUTPATIENT)
Dept: INTERNAL MEDICINE CLINIC | Age: 73
End: 2018-12-11

## 2018-12-11 VITALS
WEIGHT: 220 LBS | OXYGEN SATURATION: 99 % | SYSTOLIC BLOOD PRESSURE: 135 MMHG | HEART RATE: 63 BPM | HEIGHT: 71 IN | RESPIRATION RATE: 16 BRPM | DIASTOLIC BLOOD PRESSURE: 79 MMHG | BODY MASS INDEX: 30.8 KG/M2 | TEMPERATURE: 97.7 F

## 2018-12-11 DIAGNOSIS — Z76.0 MEDICATION REFILL: ICD-10-CM

## 2018-12-11 DIAGNOSIS — R73.9 ELEVATED BLOOD SUGAR: ICD-10-CM

## 2018-12-11 DIAGNOSIS — E78.5 DYSLIPIDEMIA: Primary | ICD-10-CM

## 2018-12-11 DIAGNOSIS — Z23 ENCOUNTER FOR IMMUNIZATION: ICD-10-CM

## 2018-12-11 RX ORDER — ALPRAZOLAM 0.5 MG/1
TABLET ORAL
Qty: 45 TAB | Refills: 2 | Status: SHIPPED | OUTPATIENT
Start: 2018-12-11 | End: 2019-07-16

## 2018-12-11 RX ORDER — SIMVASTATIN 40 MG/1
40 TABLET, FILM COATED ORAL DAILY
Qty: 90 TAB | Refills: 5 | Status: SHIPPED | OUTPATIENT
Start: 2018-12-11 | End: 2020-02-25 | Stop reason: SDUPTHER

## 2018-12-11 RX ORDER — VENLAFAXINE HYDROCHLORIDE 75 MG/1
CAPSULE, EXTENDED RELEASE ORAL DAILY
COMMUNITY
End: 2018-12-11 | Stop reason: SDUPTHER

## 2018-12-11 RX ORDER — VENLAFAXINE HYDROCHLORIDE 75 MG/1
75 CAPSULE, EXTENDED RELEASE ORAL DAILY
Qty: 90 CAP | Refills: 1 | Status: SHIPPED | OUTPATIENT
Start: 2018-12-11 | End: 2020-02-25

## 2018-12-11 NOTE — PROGRESS NOTES
Alicia Holland is a 68 y.o. male (: 1945) presenting to address:    Chief Complaint   Patient presents with        Cholesterol Problem       There were no vitals filed for this visit. Hearing/Vision:   No exam data present    Learning Assessment:     Learning Assessment 2016   PRIMARY LEARNER Patient   PRIMARY LANGUAGE ENGLISH   LEARNER PREFERENCE PRIMARY DEMONSTRATION   ANSWERED BY Patient   RELATIONSHIP SELF     Depression Screening:     PHQ over the last two weeks 2018   PHQ Not Done -   Little interest or pleasure in doing things Not at all   Feeling down, depressed, irritable, or hopeless Not at all   Total Score PHQ 2 0     Fall Risk Assessment:     Fall Risk Assessment, last 12 mths 2018   Able to walk? Yes   Fall in past 12 months? No   Fall with injury? -   Number of falls in past 12 months -   Fall Risk Score -     Abuse Screening:     Abuse Screening Questionnaire 2018   Do you ever feel afraid of your partner? N   Are you in a relationship with someone who physically or mentally threatens you? N   Is it safe for you to go home? Y     Coordination of Care Questionaire:   1. Have you been to the ER, urgent care clinic since your last visit? Hospitalized since your last visit? NO    2. Have you seen or consulted any other health care providers outside of the [address] [place] [address] since your last visit? Include any pap smears or colon screening. NO    Advanced Directive:   1. Do you have an Advanced Directive? NO    2. Would you like information on Advanced Directives? YES    Flu Immunization/s administered 2018 by Nome Burow in right deltoid. Patient tolerated procedure well. No reactions noted. Pneummococcal Immunization/s administered 2018 by Nome Burow in right deltoid. Patient tolerated procedure well. No reactions noted.

## 2018-12-11 NOTE — ACP (ADVANCE CARE PLANNING)
Pt states he had advanced directives and will provide us with copies. He desires comfort only if unable to recover.  Does not want to be in a vegetative state with artifical life supports  Wife is his POA

## 2018-12-11 NOTE — PROGRESS NOTES
HISTORY OF PRESENT ILLNESS  Elaina Greenberg is a 68 y.o. male. Visit Vitals  /79   Pulse 63   Temp 97.7 °F (36.5 °C)   Resp 16   Ht 5' 11\" (1.803 m)   Wt 220 lb (99.8 kg)   SpO2 99%   BMI 30.68 kg/m²       Saw Dr. iNnfa Little and is happy with last report. Lung capacity improving on current meds    His [facility] doctor changed his effexor to extended relief        Review of Systems   Constitutional: Negative. Respiratory: Negative for cough and shortness of breath. Cardiovascular: Negative for chest pain and palpitations. Neurological: Negative. Physical Exam   Constitutional: He is oriented to person, place, and time. He appears well-developed and well-nourished. No distress. Cardiovascular: Normal rate and regular rhythm. Pulmonary/Chest: Effort normal and breath sounds normal.   Musculoskeletal: He exhibits no edema. Neurological: He is alert and oriented to person, place, and time. Skin: Skin is warm and dry. He is not diaphoretic. Psychiatric: He has a normal mood and affect. Nursing note and vitals reviewed. ASSESSMENT and PLAN    ICD-10-CM ICD-9-CM    1. Dyslipidemia E78.5 272.4 simvastatin (ZOCOR) 40 mg tablet   2. Elevated blood sugar R73.9 790.29    3. Encounter for immunization Z23 V03.89 INFLUENZA VACCINE INACTIVATED (IIV), SUBUNIT, ADJUVANTED, IM      ADMIN INFLUENZA VIRUS VAC      ADMIN PNEUMOCOCCAL VACCINE      PNEUMOCOCCAL POLYSACCHARIDE VACCINE, 23-VALENT, ADULT OR IMMUNOSUPPRESSED PT DOSE,   4. Medication refill Z76.0 V68.1 ALPRAZolam (XANAX) 0.5 mg tablet      simvastatin (ZOCOR) 40 mg tablet       Will forgo on lab today    Discussed BMI/weight, lifestyle, diet and exercise. Discussed effect on blood pressure, blood sugar, and joints especially  Focus on limiting white carbs, portion control, and moving more.     F/u 6 months for next refills

## 2018-12-11 NOTE — PROGRESS NOTES
Pharmacy Note: Immunization Update    Patient: Kate Johnson (83 y.o., 1945)     Patient's immunization history was updated to reflect information contained in the [place] and/or outside immunization/pharmacy records were reconciled within [address] [facility]. Health Maintenance schedule updated when appropriate.     Current immunizations now reflect:       Immunization History   Administered Date(s) Administered    Influenza High Dose Vaccine PF 10/15/2015, 10/28/2016, 12/11/2017    Influenza Vaccine PF 09/26/2014    Pneumococcal Conjugate (PCV-13) 07/25/2017    Tdap 12/16/2013, 09/14/2014       Jez Norman, PharmD, BCACP

## 2019-07-16 ENCOUNTER — HOSPITAL ENCOUNTER (OUTPATIENT)
Dept: LAB | Age: 74
Discharge: HOME | End: 2019-07-16
Payer: COMMERCIAL

## 2019-07-16 ENCOUNTER — OFFICE VISIT (OUTPATIENT)
Dept: INTERNAL MEDICINE CLINIC | Age: 74
End: 2019-07-16

## 2019-07-16 ENCOUNTER — DOCUMENTATION ONLY (OUTPATIENT)
Dept: INTERNAL MEDICINE CLINIC | Age: 74
End: 2019-07-16

## 2019-07-16 VITALS
HEIGHT: 71 IN | RESPIRATION RATE: 20 BRPM | WEIGHT: 218 LBS | SYSTOLIC BLOOD PRESSURE: 110 MMHG | DIASTOLIC BLOOD PRESSURE: 73 MMHG | BODY MASS INDEX: 30.52 KG/M2 | HEART RATE: 75 BPM | OXYGEN SATURATION: 95 % | TEMPERATURE: 98.6 F

## 2019-07-16 DIAGNOSIS — R73.9 ELEVATED BLOOD SUGAR: ICD-10-CM

## 2019-07-16 DIAGNOSIS — E78.5 DYSLIPIDEMIA: ICD-10-CM

## 2019-07-16 DIAGNOSIS — E78.5 DYSLIPIDEMIA: Primary | ICD-10-CM

## 2019-07-16 DIAGNOSIS — F41.9 ANXIETY: ICD-10-CM

## 2019-07-16 LAB
ALBUMIN SERPL-MCNC: 4.4 G/DL (ref 3.4–5)
ALBUMIN/GLOB SERPL: 1.5 {RATIO} (ref 0.8–1.7)
ALP SERPL-CCNC: 76 U/L (ref 45–117)
ALT SERPL-CCNC: 29 U/L (ref 16–61)
ANION GAP SERPL CALC-SCNC: 12 MMOL/L (ref 3–18)
AST SERPL-CCNC: 18 U/L (ref 15–37)
BILIRUB SERPL-MCNC: 0.5 MG/DL (ref 0.2–1)
BUN SERPL-MCNC: 30 MG/DL (ref 7–18)
BUN/CREAT SERPL: 32 (ref 12–20)
CALCIUM SERPL-MCNC: 9.1 MG/DL (ref 8.5–10.1)
CHLORIDE SERPL-SCNC: 106 MMOL/L (ref 100–108)
CHOLEST SERPL-MCNC: 208 MG/DL
CO2 SERPL-SCNC: 26 MMOL/L (ref 21–32)
CREAT SERPL-MCNC: 0.95 MG/DL (ref 0.6–1.3)
GLOBULIN SER CALC-MCNC: 2.9 G/DL (ref 2–4)
GLUCOSE SERPL-MCNC: 80 MG/DL (ref 74–99)
HDLC SERPL-MCNC: 65 MG/DL (ref 40–60)
HDLC SERPL: 3.2 {RATIO} (ref 0–5)
LDLC SERPL CALC-MCNC: 125.4 MG/DL (ref 0–100)
LIPID PROFILE,FLP: ABNORMAL
POTASSIUM SERPL-SCNC: 4.2 MMOL/L (ref 3.5–5.5)
PROT SERPL-MCNC: 7.3 G/DL (ref 6.4–8.2)
SODIUM SERPL-SCNC: 144 MMOL/L (ref 136–145)
TRIGL SERPL-MCNC: 88 MG/DL (ref ?–150)
VLDLC SERPL CALC-MCNC: 17.6 MG/DL

## 2019-07-16 PROCEDURE — 80061 LIPID PANEL: CPT

## 2019-07-16 PROCEDURE — 36415 COLL VENOUS BLD VENIPUNCTURE: CPT

## 2019-07-16 PROCEDURE — 83036 HEMOGLOBIN GLYCOSYLATED A1C: CPT

## 2019-07-16 PROCEDURE — 80053 COMPREHEN METABOLIC PANEL: CPT

## 2019-07-16 NOTE — PROGRESS NOTES
HISTORY OF PRESENT ILLNESS  Patria Lopez is a 68 y.o. male. Visit Vitals  /73   Pulse 75   Temp 98.6 °F (37 °C) (Oral)   Resp 20   Ht 5' 11\" (1.803 m)   Wt 218 lb (98.9 kg)   SpO2 95%   BMI 30.40 kg/m²                   Current Outpatient Medications:  simvastatin (ZOCOR) 40 mg tablet, Take 1 Tab by mouth daily. ---he actually takes rarely/every 3rd day    venlafaxine-SR (EFFEXOR XR) 75 mg capsule, Take 1 Cap by mouth daily. coenzyme q10 (CO Q-10) 10 mg cap, Take  by mouth daily. BREO ELLIPTA 200-25 mcg/dose inhaler,   ALPRAZolam (XANAX) 0.5 mg tablet, TAKE ONE TABLET THREE TIMES A DAY  ibuprofen (MOTRIN) 800 mg tablet, Take 1 Tab by mouth every eight (8) hours as needed for Pain. ASPIRIN (ASPIR-81 PO), Take  by mouth. Cholesterol Problem   The history is provided by the patient. This is a chronic problem. The current episode started more than 1 week ago. The problem occurs daily. Pertinent negatives include no chest pain, no headaches and no shortness of breath. Exacerbated by: diet. The symptoms are relieved by medications (diet). Treatments tried: see med list.       Review of Systems   Constitutional: Negative. Respiratory: Negative for shortness of breath. Cardiovascular: Negative for chest pain and palpitations. Neurological: Negative for dizziness and headaches. Psychiatric/Behavioral: The patient is nervous/anxious. Physical Exam   Constitutional: He is oriented to person, place, and time. He appears well-developed and well-nourished. No distress. Cardiovascular: Normal rate and regular rhythm. Pulmonary/Chest: Effort normal and breath sounds normal.   Musculoskeletal: He exhibits no edema. Neurological: He is alert and oriented to person, place, and time. Skin: Skin is warm and dry. He is not diaphoretic. Psychiatric: He has a normal mood and affect. Nursing note and vitals reviewed. ASSESSMENT and PLAN    ICD-10-CM ICD-9-CM    1.  Dyslipidemia E78.5 272.4 LIPID PANEL   2. Elevated blood sugar T43.8 494.25 METABOLIC PANEL, COMPREHENSIVE      HEMOGLOBIN A1C W/O EAG   3. Anxiety F41.9 300.00      Update lab    Discussed BMI/weight, lifestyle, diet and exercise. Discussed effect on blood pressure, blood sugar, and joints especially  Focus on limiting white carbs, portion control, and moving more. He is working on his diet and his wife works on [employer] with him. Greater than 50% of appointment time was spent in counseling. Pt needed to ventilate about his job and possible [employer] including the effect on his health long term. He appears to be suffering from burnout. Anxiety/depression OK. Has actually tapered a lot off meds. Rarely takes xanax now  Again thinking about retiring from [place]. F/u 6 months for chol check, blood sugar check, med check.

## 2019-07-16 NOTE — PROGRESS NOTES
ROOM # 5    Svetlana Mendoza presents today for   Chief Complaint   Patient presents with    Cholesterol Problem       Svetlana Mendoza preferred language for health care discussion is english/other. Is someone accompanying this pt? no    Is the patient using any DME equipment during OV? no    Depression Screening:  3 most recent [facility] Screens 12/11/2018 6/11/2018 12/11/2017 7/25/2017 10/22/2016 10/15/2015   PHQ Not Done - - - Active Diagnosis of Depression or Bipolar Disorder - -   Little interest or pleasure in doing things Not at all Not at all Not at all Not at all Several days Not at all   Feeling down, depressed, irritable, or hopeless Not at all Not at all Not at all Not at all Several days Not at all   Total Score PHQ 2 0 0 0 0 2 0       Learning Assessment:  Learning Assessment 11/11/2016 12/16/2013   PRIMARY LEARNER Patient Patient   PRIMARY LANGUAGE ENGLISH ENGLISH   LEARNER PREFERENCE PRIMARY DEMONSTRATION LISTENING   ANSWERED BY Patient patient   RELATIONSHIP SELF SELF       Abuse Screening:  Abuse Screening Questionnaire 6/11/2018   Do you ever feel afraid of your partner? N   Are you in a relationship with someone who physically or mentally threatens you? N   Is it safe for you to go home? Y       Fall Risk  Fall Risk Assessment, last 12 mths 12/11/2018 6/11/2018 12/11/2017 7/25/2017 11/11/2016 10/22/2016 10/15/2015   Able to walk? Yes Yes Yes Yes Yes Yes Yes   Fall in past 12 months? No No No No Yes Yes No   Fall with injury? - - - - Yes No -   Number of falls in past 12 months - - - - 2 - -   Fall Risk Score - - - - 3 - -       Health Maintenance reviewed and discussed per provider. Yes    Svetlana Mendoza is due for   Health Maintenance Due   Topic Date Due    Shingrix Vaccine Age 50> (1 of 2) 08/29/1996    GLAUCOMA SCREENING Q2Y  08/29/2011     Please order/place referral if appropriate. Advance Directive:  1. Do you have an advance directive in place? Patient Reply: no    2.  If not, would you like material regarding how to put one in place? Patient Reply: no    Coordination of Care:  1. Have you been to the ER, urgent care clinic since your last visit? Hospitalized since your last visit? no    2. Have you seen or consulted any other health care providers outside of the [address] [place] [address] since your last visit? Include any pap smears or colon screening.  pulmonologist

## 2019-07-17 LAB — HBA1C MFR BLD: 6.1 % (ref 4.2–5.6)

## 2020-02-25 ENCOUNTER — OFFICE VISIT (OUTPATIENT)
Dept: INTERNAL MEDICINE CLINIC | Age: 75
End: 2020-02-25

## 2020-02-25 ENCOUNTER — HOSPITAL ENCOUNTER (OUTPATIENT)
Dept: LAB | Age: 75
Discharge: HOME | End: 2020-02-25
Payer: COMMERCIAL

## 2020-02-25 VITALS
BODY MASS INDEX: 31.08 KG/M2 | WEIGHT: 222 LBS | TEMPERATURE: 98.3 F | HEART RATE: 72 BPM | DIASTOLIC BLOOD PRESSURE: 80 MMHG | SYSTOLIC BLOOD PRESSURE: 135 MMHG | HEIGHT: 71 IN | RESPIRATION RATE: 22 BRPM | OXYGEN SATURATION: 99 %

## 2020-02-25 DIAGNOSIS — R73.9 ELEVATED BLOOD SUGAR: ICD-10-CM

## 2020-02-25 DIAGNOSIS — F41.9 ANXIETY: ICD-10-CM

## 2020-02-25 DIAGNOSIS — E78.5 DYSLIPIDEMIA: ICD-10-CM

## 2020-02-25 DIAGNOSIS — Z76.0 MEDICATION REFILL: ICD-10-CM

## 2020-02-25 DIAGNOSIS — H93.A9 PULSATILE TINNITUS: Primary | ICD-10-CM

## 2020-02-25 LAB
ANION GAP SERPL CALC-SCNC: 5 MMOL/L (ref 3–18)
BUN SERPL-MCNC: 19 MG/DL (ref 7–18)
BUN/CREAT SERPL: 20 (ref 12–20)
CALCIUM SERPL-MCNC: 9.1 MG/DL (ref 8.5–10.1)
CHLORIDE SERPL-SCNC: 111 MMOL/L (ref 100–111)
CHOLEST SERPL-MCNC: 207 MG/DL
CO2 SERPL-SCNC: 27 MMOL/L (ref 21–32)
CREAT SERPL-MCNC: 0.93 MG/DL (ref 0.6–1.3)
GLUCOSE SERPL-MCNC: 101 MG/DL (ref 74–99)
HBA1C MFR BLD: 6.3 % (ref 4.2–5.6)
HDLC SERPL-MCNC: 49 MG/DL (ref 40–60)
HDLC SERPL: 4.2 {RATIO} (ref 0–5)
LDLC SERPL CALC-MCNC: 135 MG/DL (ref 0–100)
LIPID PROFILE,FLP: ABNORMAL
POTASSIUM SERPL-SCNC: 4.2 MMOL/L (ref 3.5–5.5)
SODIUM SERPL-SCNC: 143 MMOL/L (ref 136–145)
TRIGL SERPL-MCNC: 115 MG/DL (ref ?–150)
VLDLC SERPL CALC-MCNC: 23 MG/DL

## 2020-02-25 PROCEDURE — 80048 BASIC METABOLIC PNL TOTAL CA: CPT

## 2020-02-25 PROCEDURE — 36415 COLL VENOUS BLD VENIPUNCTURE: CPT

## 2020-02-25 PROCEDURE — 83036 HEMOGLOBIN GLYCOSYLATED A1C: CPT

## 2020-02-25 PROCEDURE — 80061 LIPID PANEL: CPT

## 2020-02-25 RX ORDER — FLUTICASONE FUROATE AND VILANTEROL TRIFENATATE 200; 25 UG/1; UG/1
1 POWDER RESPIRATORY (INHALATION) DAILY
Qty: 3 EACH | Refills: 5 | Status: SHIPPED | OUTPATIENT
Start: 2020-02-25 | End: 2020-03-19 | Stop reason: SDUPTHER

## 2020-02-25 RX ORDER — SIMVASTATIN 40 MG/1
40 TABLET, FILM COATED ORAL DAILY
Qty: 90 TAB | Refills: 5 | Status: SHIPPED | OUTPATIENT
Start: 2020-02-25 | End: 2020-10-27 | Stop reason: SDUPTHER

## 2020-02-25 NOTE — PROGRESS NOTES
HISTORY OF PRESENT ILLNESS  Bijan Carson is a 69 y.o. male. /80 (BP 1 Location: Left arm, BP Patient Position: Sitting)   Pulse 72   Temp 98.3 °F (36.8 °C) (Oral)   Resp 22   Ht 5' 11\" (1.803 m)   Wt 222 lb (100.7 kg)   SpO2 99%   BMI 30.96 kg/m²         Current Outpatient Medications:  COQ10, UBIQUINOL, PO, Take  by mouth. simvastatin (ZOCOR) 40 mg tablet, Take 1 Tab by mouth daily. coenzyme q10 (CO Q-10) 10 mg cap, Take  by mouth daily. BREO ELLIPTA 200-25 mcg/dose inhaler,   venlafaxine-SR (EFFEXOR XR) 75 mg capsule, Take 1 Cap by mouth daily. When first coming off effexor he had a loud swishing in his head in the right side of head  This is now getting better. He has seen ENT, Dr. Aiden Jaramillo. He is not sure if he addressed it there    He has also see a chiropractor      He has finally decided to retire this date. Cholesterol Problem   The history is provided by the patient. This is a chronic problem. The problem occurs daily. The problem has not changed since onset. Exacerbated by: diet. The symptoms are relieved by medications (diet). Treatments tried: see med list.   Depression   The history is provided by the patient. This is a chronic problem. The current episode started more than 1 week ago. The problem occurs daily. The problem has been gradually improving. Review of Systems   Constitutional: Negative. HENT: Positive for hearing loss (age related) and tinnitus (pulsatile, right sided). Psychiatric/Behavioral: Positive for depression. Physical Exam  Vitals signs and nursing note reviewed. Constitutional:       General: He is not in acute distress. Appearance: He is well-developed. He is not diaphoretic. HENT:      Right Ear: Tympanic membrane, ear canal and external ear normal.      Left Ear: Tympanic membrane, ear canal and external ear normal.   Neck:      Vascular: No carotid bruit. Cardiovascular:      Rate and Rhythm: Normal rate and regular rhythm. Pulmonary:      Effort: Pulmonary effort is normal.      Breath sounds: Normal breath sounds. Skin:     General: Skin is warm and dry. Neurological:      General: No focal deficit present. Mental Status: He is alert and oriented to person, place, and time. Coordination: Coordination is intact. Coordination normal. Finger-Nose-Finger Test normal.      Comments: Finger to nose intact     Psychiatric:         Mood and Affect: Mood normal.         Behavior: Behavior normal.         ASSESSMENT and PLAN    ICD-10-CM ICD-9-CM    1. Pulsatile tinnitus H93. A9 388.30 DUPLEX CAROTID BILATERAL   2. Dyslipidemia E78.5 272.4 LIPID PANEL      simvastatin (ZOCOR) 40 mg tablet   3. Anxiety F41.9 300.00    4. Elevated blood sugar P20.9 231.90 METABOLIC PANEL, BASIC      HEMOGLOBIN A1C W/O EAG   5. Medication refill Z76.0 V68.1 simvastatin (ZOCOR) 40 mg tablet       discussed with patient. Will order carotid study  Encouraged to f/u also with Dr. Loni Borrego. Due for lab update. F/u 6 months for chol.  Blood sugar

## 2020-02-25 NOTE — PROGRESS NOTES
ROOM # 4    Enedina Denise presents today for   Chief Complaint   Patient presents with    Cholesterol Problem       Enedina Denise preferred language for health care discussion is english/other. Is someone accompanying this pt? no    Is the patient using any DME equipment during OV? no    Depression Screening:  3 most recent [facility] Screens 2/25/2020 2/25/2020 12/11/2018 6/11/2018 12/11/2017 7/25/2017 10/22/2016   PHQ Not Done - - - - - Active Diagnosis of Depression or Bipolar Disorder -   Little interest or pleasure in doing things Not at all Not at all Not at all Not at all Not at all Not at all Several days   Feeling down, depressed, irritable, or hopeless Not at all Not at all Not at all Not at all Not at all Not at all Several days   Total Score PHQ 2 0 0 0 0 0 0 2       Learning Assessment:  Learning Assessment 11/11/2016 12/16/2013   PRIMARY LEARNER Patient Patient   PRIMARY LANGUAGE ENGLISH ENGLISH   LEARNER PREFERENCE PRIMARY DEMONSTRATION LISTENING   ANSWERED BY Patient patient   RELATIONSHIP SELF SELF       Abuse Screening:  Abuse Screening Questionnaire 6/11/2018   Do you ever feel afraid of your partner? N   Are you in a relationship with someone who physically or mentally threatens you? N   Is it safe for you to go home? Y       Fall Risk  Fall Risk Assessment, last 12 mths 2/25/2020 12/11/2018 6/11/2018 12/11/2017 7/25/2017 11/11/2016 10/22/2016   Able to walk? Yes Yes Yes Yes Yes Yes Yes   Fall in past 12 months? No No No No No Yes Yes   Fall with injury? - - - - - Yes No   Number of falls in past 12 months - - - - - 2 -   Fall Risk Score - - - - - 3 -           Health Maintenance reviewed and discussed per provider. Yes    Enedina Denise is due for   Health Maintenance Due   Topic Date Due    Influenza Age 5 to Adult  08/01/2019     Please order/place referral if appropriate. Advance Directive:  1. Do you have an advance directive in place? Patient Reply: no    2.  If not, would you like material regarding how to put one in place? Patient Reply: no    Coordination of Care:  1. Have you been to the ER, urgent care clinic since your last visit? Hospitalized since your last visit? no    2. Have you seen or consulted any other health care providers outside of the [address] [place] [address] since your last visit? Include any pap smears or colon screening.  [facility]       Flu shot given at [facility]

## 2020-03-12 ENCOUNTER — HOSPITAL ENCOUNTER (OUTPATIENT)
Dept: VASCULAR SURGERY | Age: 75
Discharge: HOME | End: 2020-03-12
Attending: INTERNAL MEDICINE
Payer: COMMERCIAL

## 2020-03-12 DIAGNOSIS — H93.A9 PULSATILE TINNITUS: ICD-10-CM

## 2020-03-12 LAB
LEFT CCA DIST DIAS: 17.5 CM/S
LEFT CCA DIST SYS: 47.1 CM/S
LEFT CCA MID DIAS: 23 CM/S
LEFT CCA MID SYS: 69.1 CM/S
LEFT CCA PROX DIAS: 19.7 CM/S
LEFT CCA PROX SYS: 65.8 CM/S
LEFT ECA DIAS: 16.4 CM/S
LEFT ECA SYS: 70.2 CM/S
LEFT ICA DIST DIAS: 29.6 CM/S
LEFT ICA DIST SYS: 72.4 CM/S
LEFT ICA MID DIAS: 30.7 CM/S
LEFT ICA MID SYS: 71.3 CM/S
LEFT ICA PROX DIAS: 21.9 CM/S
LEFT ICA PROX SYS: 48.2 CM/S
LEFT ICA/CCA SYS: 1
LEFT SUBCLAVIAN DIAS: 0 CM/S
LEFT SUBCLAVIAN SYS: 74.6 CM/S
LEFT VERTEBRAL DIAS: 18.6 CM/S
LEFT VERTEBRAL SYS: 50.4 CM/S
RIGHT CCA DIST DIAS: 15.5 CM/S
RIGHT CCA DIST SYS: 66.4 CM/S
RIGHT CCA MID DIAS: 16.8 CM/S
RIGHT CCA MID SYS: 70.3 CM/S
RIGHT CCA PROX DIAS: 18.1 CM/S
RIGHT CCA PROX SYS: 71.6 CM/S
RIGHT ECA DIAS: 10.9 CM/S
RIGHT ECA SYS: 65.8 CM/S
RIGHT ICA DIST DIAS: 28.9 CM/S
RIGHT ICA DIST SYS: 79.9 CM/S
RIGHT ICA MID DIAS: 31.7 CM/S
RIGHT ICA MID SYS: 70.5 CM/S
RIGHT ICA PROX DIAS: 25.4 CM/S
RIGHT ICA PROX SYS: 55.6 CM/S
RIGHT ICA/CCA SYS: 1.1
RIGHT SUBCLAVIAN DIAS: 0 CM/S
RIGHT SUBCLAVIAN SYS: 63.2 CM/S
RIGHT VERTEBRAL DIAS: 24.1 CM/S
RIGHT VERTEBRAL SYS: 54.8 CM/S

## 2020-03-12 PROCEDURE — 93880 EXTRACRANIAL BILAT STUDY: CPT

## 2020-03-19 RX ORDER — FLUTICASONE FUROATE AND VILANTEROL TRIFENATATE 200; 25 UG/1; UG/1
1 POWDER RESPIRATORY (INHALATION) DAILY
Qty: 3 EACH | Refills: 5 | Status: SHIPPED | OUTPATIENT
Start: 2020-03-19 | End: 2020-10-27 | Stop reason: SDUPTHER

## 2020-10-08 ENCOUNTER — IMMUNIZATION CLINIC (OUTPATIENT)
Dept: INTERNAL MEDICINE CLINIC | Age: 75
End: 2020-10-08
Payer: MEDICARE

## 2020-10-08 VITALS — TEMPERATURE: 97.7 F

## 2020-10-08 DIAGNOSIS — Z23 ENCOUNTER FOR IMMUNIZATION: ICD-10-CM

## 2020-10-08 DIAGNOSIS — Z23 NEEDS FLU SHOT: Primary | ICD-10-CM

## 2020-10-08 PROCEDURE — 90694 VACC AIIV4 NO PRSRV 0.5ML IM: CPT | Performed by: INTERNAL MEDICINE

## 2020-10-08 PROCEDURE — 90471 IMMUNIZATION ADMIN: CPT | Performed by: INTERNAL MEDICINE

## 2020-10-08 NOTE — PROGRESS NOTES
Influenza immunization administered left deltoid  10/8/2020 by Eze Carney LPN with pt's consent. Patient tolerated procedure well. No reactions noted.

## 2020-10-27 ENCOUNTER — HOSPITAL ENCOUNTER (OUTPATIENT)
Dept: LAB | Age: 75
Discharge: HOME | End: 2020-10-27
Payer: COMMERCIAL

## 2020-10-27 ENCOUNTER — OFFICE VISIT (OUTPATIENT)
Dept: INTERNAL MEDICINE CLINIC | Age: 75
End: 2020-10-27
Payer: COMMERCIAL

## 2020-10-27 VITALS
WEIGHT: 213 LBS | BODY MASS INDEX: 29.82 KG/M2 | RESPIRATION RATE: 22 BRPM | DIASTOLIC BLOOD PRESSURE: 91 MMHG | SYSTOLIC BLOOD PRESSURE: 134 MMHG | HEART RATE: 74 BPM | HEIGHT: 71 IN | TEMPERATURE: 98.2 F | OXYGEN SATURATION: 98 %

## 2020-10-27 DIAGNOSIS — Z76.0 MEDICATION REFILL: ICD-10-CM

## 2020-10-27 DIAGNOSIS — E78.5 DYSLIPIDEMIA: Primary | ICD-10-CM

## 2020-10-27 DIAGNOSIS — E78.5 DYSLIPIDEMIA: ICD-10-CM

## 2020-10-27 DIAGNOSIS — R73.9 ELEVATED BLOOD SUGAR: ICD-10-CM

## 2020-10-27 DIAGNOSIS — F41.9 ANXIETY: ICD-10-CM

## 2020-10-27 LAB
ANION GAP SERPL CALC-SCNC: 6 MMOL/L (ref 3–18)
BUN SERPL-MCNC: 18 MG/DL (ref 7–18)
BUN/CREAT SERPL: 21 (ref 12–20)
CALCIUM SERPL-MCNC: 9.1 MG/DL (ref 8.5–10.1)
CHLORIDE SERPL-SCNC: 108 MMOL/L (ref 100–111)
CHOLEST SERPL-MCNC: 205 MG/DL
CO2 SERPL-SCNC: 28 MMOL/L (ref 21–32)
CREAT SERPL-MCNC: 0.86 MG/DL (ref 0.6–1.3)
GLUCOSE SERPL-MCNC: 97 MG/DL (ref 74–99)
HBA1C MFR BLD: 5.9 % (ref 4.2–5.6)
HDLC SERPL-MCNC: 50 MG/DL (ref 40–60)
HDLC SERPL: 4.1 {RATIO} (ref 0–5)
LDLC SERPL CALC-MCNC: 128.2 MG/DL (ref 0–100)
LIPID PROFILE,FLP: ABNORMAL
POTASSIUM SERPL-SCNC: 4.3 MMOL/L (ref 3.5–5.5)
SODIUM SERPL-SCNC: 142 MMOL/L (ref 136–145)
TRIGL SERPL-MCNC: 134 MG/DL (ref ?–150)
VLDLC SERPL CALC-MCNC: 26.8 MG/DL

## 2020-10-27 PROCEDURE — 1101F PT FALLS ASSESS-DOCD LE1/YR: CPT | Performed by: INTERNAL MEDICINE

## 2020-10-27 PROCEDURE — 99214 OFFICE O/P EST MOD 30 MIN: CPT | Performed by: INTERNAL MEDICINE

## 2020-10-27 PROCEDURE — 80048 BASIC METABOLIC PNL TOTAL CA: CPT

## 2020-10-27 PROCEDURE — 36415 COLL VENOUS BLD VENIPUNCTURE: CPT

## 2020-10-27 PROCEDURE — G8432 DEP SCR NOT DOC, RNG: HCPCS | Performed by: INTERNAL MEDICINE

## 2020-10-27 PROCEDURE — G8427 DOCREV CUR MEDS BY ELIG CLIN: HCPCS | Performed by: INTERNAL MEDICINE

## 2020-10-27 PROCEDURE — G8419 CALC BMI OUT NRM PARAM NOF/U: HCPCS | Performed by: INTERNAL MEDICINE

## 2020-10-27 PROCEDURE — 3017F COLORECTAL CA SCREEN DOC REV: CPT | Performed by: INTERNAL MEDICINE

## 2020-10-27 PROCEDURE — G8536 NO DOC ELDER MAL SCRN: HCPCS | Performed by: INTERNAL MEDICINE

## 2020-10-27 PROCEDURE — 80061 LIPID PANEL: CPT

## 2020-10-27 PROCEDURE — 83036 HEMOGLOBIN GLYCOSYLATED A1C: CPT

## 2020-10-27 RX ORDER — SIMVASTATIN 40 MG/1
40 TABLET, FILM COATED ORAL DAILY
Qty: 90 TAB | Refills: 5 | Status: SHIPPED | OUTPATIENT
Start: 2020-10-27 | End: 2022-01-27

## 2020-10-27 RX ORDER — IBUPROFEN 800 MG/1
800 TABLET ORAL
Qty: 100 TAB | Refills: 5 | Status: SHIPPED | OUTPATIENT
Start: 2020-10-27 | End: 2022-07-28

## 2020-10-27 RX ORDER — FLUTICASONE FUROATE AND VILANTEROL TRIFENATATE 200; 25 UG/1; UG/1
1 POWDER RESPIRATORY (INHALATION) DAILY
Qty: 3 EACH | Refills: 5 | Status: SHIPPED | OUTPATIENT
Start: 2020-10-27

## 2020-10-27 NOTE — PROGRESS NOTES
HISTORY OF PRESENT ILLNESS  Gretel Connolly is a 77 y.o. male. Visit Vitals  BP (!) 134/91 (BP 1 Location: Right arm, BP Patient Position: Sitting)   Pulse 74   Temp 98.2 °F (36.8 °C) (Oral)   Resp 22   Ht 5' 11\" (1.803 m)   Wt 213 lb (96.6 kg)   SpO2 98%   BMI 29.71 kg/m²       Finally retired. Has some property [place] [address]. Slowly transitioning more and more to there. Dose report that marijuana does help to calm his nerves. He asked about whether medical marijuana is here in [place] yet. (It is in the process of being set up in [place])    Cholesterol Problem   The history is provided by the patient. This is a chronic problem. The current episode started more than 1 week ago. The problem occurs daily. The problem has not changed since onset. Pertinent negatives include no chest pain. Exacerbated by: diet. The symptoms are relieved by medications (diet). Blood sugar problem   The history is provided by the patient (intermittent elevated blood sugar). This is a chronic problem. The current episode started more than 1 week ago. The problem occurs daily. Pertinent negatives include no chest pain. Review of Systems   Constitutional: Negative for chills and fever. Respiratory: Negative. Cardiovascular: Negative for chest pain and palpitations. Psychiatric/Behavioral: The patient is nervous/anxious. Physical Exam  Vitals signs and nursing note reviewed. Constitutional:       General: He is not in acute distress. Appearance: Normal appearance. He is well-developed. He is not diaphoretic. Cardiovascular:      Rate and Rhythm: Normal rate and regular rhythm. Pulmonary:      Effort: Pulmonary effort is normal.      Breath sounds: Normal breath sounds. Musculoskeletal:      Right lower leg: No edema. Left lower leg: No edema. Skin:     General: Skin is warm and dry. Neurological:      Mental Status: He is alert and oriented to person, place, and time. Psychiatric:         Mood and Affect: Mood normal.         Behavior: Behavior normal.         ASSESSMENT and PLAN    ICD-10-CM ICD-9-CM    1. Dyslipidemia  E78.5 272.4 simvastatin (Zocor) 40 mg tablet      LIPID PANEL   2. Elevated blood sugar  D69.7 159.85 METABOLIC PANEL, BASIC      HEMOGLOBIN A1C W/O EAG   3. Anxiety  F41.9 300.00    4.  Medication refill  Z76.0 V68.1 Breo Ellipta 200-25 mcg/dose inhaler      simvastatin (Zocor) 40 mg tablet      ibuprofen (MOTRIN) 800 mg tablet       Doing well overall    Update lab    Refill as noted    F/u 6 months

## 2020-10-27 NOTE — PROGRESS NOTES
ROOM # 4    Parvez Kent presents today for   Chief Complaint   Patient presents with    Cholesterol Problem    Blood sugar problem       Parvez Kent preferred language for health care discussion is english/other. Is someone accompanying this pt? no    Is the patient using any DME equipment during OV? no    Depression Screening:  3 most recent [facility] Screens 2/25/2020 2/25/2020 12/11/2018 6/11/2018 12/11/2017 7/25/2017 10/22/2016   PHQ Not Done - - - - - Active Diagnosis of Depression or Bipolar Disorder -   Little interest or pleasure in doing things Not at all Not at all Not at all Not at all Not at all Not at all Several days   Feeling down, depressed, irritable, or hopeless Not at all Not at all Not at all Not at all Not at all Not at all Several days   Total Score PHQ 2 0 0 0 0 0 0 2       Learning Assessment:  Learning Assessment 11/11/2016 12/16/2013   PRIMARY LEARNER Patient Patient   PRIMARY LANGUAGE ENGLISH ENGLISH   LEARNER PREFERENCE PRIMARY DEMONSTRATION LISTENING   ANSWERED BY Patient patient   RELATIONSHIP SELF SELF       Abuse Screening:  Abuse Screening Questionnaire 6/11/2018   Do you ever feel afraid of your partner? N   Are you in a relationship with someone who physically or mentally threatens you? N   Is it safe for you to go home? Y       Fall Risk  Fall Risk Assessment, last 12 mths 2/25/2020 12/11/2018 6/11/2018 12/11/2017 7/25/2017 11/11/2016 10/22/2016   Able to walk? Yes Yes Yes Yes Yes Yes Yes   Fall in past 12 months? No No No No No Yes Yes   Fall with injury? - - - - - Yes No   Number of falls in past 12 months - - - - - 2 -   Fall Risk Score - - - - - 3 -           Health Maintenance reviewed and discussed per provider. Yes    Parvez Kent is due for   Health Maintenance Due   Topic Date Due    Shingrix Vaccine Age 50> (1 of 2) 08/29/1996    GLAUCOMA SCREENING Q2Y  06/27/2020    Medicare Yearly Exam  10/08/2020     Please order/place referral if appropriate.       Advance Directive:  1. Do you have an advance directive in place? Patient Reply: no    2. If not, would you like material regarding how to put one in place? Patient Reply: no    Coordination of Care:  1. Have you been to the ER, urgent care clinic since your last visit? Hospitalized since your last visit? no    2. Have you seen or consulted any other health care providers outside of the [address] [place] [address] since your last visit? Include any pap smears or colon screening.  no

## 2021-08-19 ENCOUNTER — HOSPITAL ENCOUNTER (OUTPATIENT)
Dept: LAB | Age: 76
Discharge: HOME | End: 2021-08-19
Payer: MEDICARE

## 2021-08-19 ENCOUNTER — OFFICE VISIT (OUTPATIENT)
Dept: INTERNAL MEDICINE CLINIC | Age: 76
End: 2021-08-19
Payer: MEDICARE

## 2021-08-19 VITALS
SYSTOLIC BLOOD PRESSURE: 107 MMHG | RESPIRATION RATE: 17 BRPM | WEIGHT: 209 LBS | BODY MASS INDEX: 29.26 KG/M2 | HEIGHT: 71 IN | DIASTOLIC BLOOD PRESSURE: 73 MMHG | OXYGEN SATURATION: 97 % | HEART RATE: 72 BPM | TEMPERATURE: 96.6 F

## 2021-08-19 DIAGNOSIS — R03.0 ELEVATED BLOOD PRESSURE READING: ICD-10-CM

## 2021-08-19 DIAGNOSIS — N32.89 BLADDER MASS: ICD-10-CM

## 2021-08-19 DIAGNOSIS — R73.9 ELEVATED BLOOD SUGAR: Primary | ICD-10-CM
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
## 2021-08-19 DIAGNOSIS — E78.5 DYSLIPIDEMIA: ICD-10-CM

## 2021-08-19 DIAGNOSIS — R73.9 ELEVATED BLOOD SUGAR: ICD-10-CM

## 2021-08-19 DIAGNOSIS — N28.89 RENAL MASS, LEFT: ICD-10-CM

## 2021-08-19 DIAGNOSIS — R31.9 HEMATURIA, UNSPECIFIED TYPE: ICD-10-CM

## 2021-08-19 LAB
ALBUMIN SERPL-MCNC: 4 G/DL (ref 3.4–5)
ALBUMIN/GLOB SERPL: 1.3 {RATIO} (ref 0.8–1.7)
ALP SERPL-CCNC: 66 U/L (ref 45–117)
ALT SERPL-CCNC: 34 U/L (ref 16–61)
ANION GAP SERPL CALC-SCNC: 4 MMOL/L (ref 3–18)
APPEARANCE UR: CLEAR
AST SERPL-CCNC: 22 U/L (ref 10–38)
BACTERIA URNS QL MICRO: ABNORMAL /HPF
BASOPHILS # BLD: 0.1 K/UL (ref 0–0.1)
BASOPHILS NFR BLD: 1 % (ref 0–2)
BILIRUB SERPL-MCNC: 0.4 MG/DL (ref 0.2–1)
BILIRUB UR QL: NEGATIVE
BUN SERPL-MCNC: 21 MG/DL (ref 7–18)
BUN/CREAT SERPL: 22 (ref 12–20)
CALCIUM SERPL-MCNC: 8.6 MG/DL (ref 8.5–10.1)
CHLORIDE SERPL-SCNC: 107 MMOL/L (ref 100–111)
CHOLEST SERPL-MCNC: 247 MG/DL
CO2 SERPL-SCNC: 28 MMOL/L (ref 21–32)
COLOR UR: ABNORMAL
CREAT SERPL-MCNC: 0.95 MG/DL (ref 0.6–1.3)
DIFFERENTIAL METHOD BLD: ABNORMAL
EOSINOPHIL # BLD: 0.3 K/UL (ref 0–0.4)
EOSINOPHIL NFR BLD: 3 % (ref 0–5)
EPITH CASTS URNS QL MICRO: ABNORMAL /LPF (ref 0–5)
ERYTHROCYTE [DISTWIDTH] IN BLOOD BY AUTOMATED COUNT: 13.9 % (ref 11.6–14.5)
GLOBULIN SER CALC-MCNC: 3 G/DL (ref 2–4)
GLUCOSE SERPL-MCNC: 99 MG/DL (ref 74–99)
GLUCOSE UR STRIP.AUTO-MCNC: NEGATIVE MG/DL
HBA1C MFR BLD: 6 % (ref 4.2–5.6)
HCT VFR BLD AUTO: 44 % (ref 36–48)
HDLC SERPL-MCNC: 53 MG/DL (ref 40–60)
HDLC SERPL: 4.7 {RATIO} (ref 0–5)
HGB BLD-MCNC: 14.3 G/DL (ref 13–16)
HGB UR QL STRIP: ABNORMAL
HYALINE CASTS URNS QL MICRO: ABNORMAL /LPF (ref 0–2)
KETONES UR QL STRIP.AUTO: ABNORMAL MG/DL
LDLC SERPL CALC-MCNC: 172.8 MG/DL (ref 0–100)
LEUKOCYTE ESTERASE UR QL STRIP.AUTO: ABNORMAL
LIPID PROFILE,FLP: ABNORMAL
LYMPHOCYTES # BLD: 1.6 K/UL (ref 0.9–3.6)
LYMPHOCYTES NFR BLD: 18 % (ref 21–52)
MCH RBC QN AUTO: 31.5 PG (ref 24–34)
MCHC RBC AUTO-ENTMCNC: 32.5 G/DL (ref 31–37)
MCV RBC AUTO: 96.9 FL (ref 74–97)
MONOCYTES # BLD: 0.7 K/UL (ref 0.05–1.2)
MONOCYTES NFR BLD: 8 % (ref 3–10)
MUCOUS THREADS URNS QL MICRO: ABNORMAL /LPF
NEUTS SEG # BLD: 6 K/UL (ref 1.8–8)
NEUTS SEG NFR BLD: 69 % (ref 40–73)
NITRITE UR QL STRIP.AUTO: NEGATIVE
PH UR STRIP: 6 [PH] (ref 5–8)
PLATELET # BLD AUTO: 270 K/UL (ref 135–420)
PMV BLD AUTO: 10.4 FL (ref 9.2–11.8)
POTASSIUM SERPL-SCNC: 4.6 MMOL/L (ref 3.5–5.5)
PROT SERPL-MCNC: 7 G/DL (ref 6.4–8.2)
PROT UR STRIP-MCNC: 100 MG/DL
RBC # BLD AUTO: 4.54 M/UL (ref 4.35–5.65)
RBC #/AREA URNS HPF: ABNORMAL /HPF (ref 0–5)
SODIUM SERPL-SCNC: 139 MMOL/L (ref 136–145)
SP GR UR REFRACTOMETRY: 1.02 (ref 1–1.03)
TRIGL SERPL-MCNC: 106 MG/DL (ref ?–150)
UROBILINOGEN UR QL STRIP.AUTO: 1 EU/DL (ref 0.2–1)
VLDLC SERPL CALC-MCNC: 21.2 MG/DL
WBC # BLD AUTO: 8.6 K/UL (ref 4.6–13.2)
WBC URNS QL MICRO: ABNORMAL /HPF (ref 0–4)

## 2021-08-19 PROCEDURE — 83036 HEMOGLOBIN GLYCOSYLATED A1C: CPT

## 2021-08-19 PROCEDURE — 81001 URINALYSIS AUTO W/SCOPE: CPT

## 2021-08-19 PROCEDURE — 80061 LIPID PANEL: CPT

## 2021-08-19 PROCEDURE — 80053 COMPREHEN METABOLIC PANEL: CPT

## 2021-08-19 PROCEDURE — 85025 COMPLETE CBC W/AUTO DIFF WBC: CPT

## 2021-08-19 PROCEDURE — 99214 OFFICE O/P EST MOD 30 MIN: CPT | Performed by: INTERNAL MEDICINE

## 2021-08-19 PROCEDURE — 36415 COLL VENOUS BLD VENIPUNCTURE: CPT

## 2021-08-19 RX ORDER — LISINOPRIL 10 MG/1
10 TABLET ORAL DAILY
COMMUNITY

## 2021-08-19 RX ORDER — DOXYCYCLINE 100 MG/1
200 CAPSULE ORAL ONCE
Qty: 2 CAPSULE | Refills: 1 | Status: SHIPPED | OUTPATIENT
Start: 2021-08-19 | End: 2021-08-19

## 2021-08-19 NOTE — PROGRESS NOTES
Reviewed record in preparation for visit and have obtained necessary documentation. Clara Palomares is a 77 y.o.  male presents today for office visit for   Chief Complaint   Patient presents with    Blood sugar problem    Cholesterol Problem    Tick Removal     right upper portion of back, found 8/18/21   . Pt is not fasting. Patient is accompanied by self. I have received verbal consent from Clara Palomares to discuss any/all medical information while they are present in the room. Pt is in Room # 1795 Dr Nate Arevalo [facility] preferred language for health care discussion is english/other. Is the patient using any DME equipment during OV? NO    Advance Directive:  1. Do you have an advance directive in place? Patient Reply: NO    2. If not, would you like material regarding how to put one in place? NO    Coordination of Care:  1. Have you been to the ER, urgent care clinic since your last visit? Hospitalized since your last visit? NO    2. Have you seen or consulted any other health care providers outside of the [address] [place] [address] since your last visit? Include any pap smears or colon screening. NO    Upcoming Appts  Urology 9/2021      Clara Palomares is due for:   Health Maintenance Due   Topic    COVID-19 Vaccine (1)     Health Maintenance reviewed and discussed per provider  Please order/place referral if appropriate.     Requested Prescriptions      No prescriptions requested or ordered in this encounter       Learning Assessment:  Learning Assessment 11/11/2016 12/16/2013   PRIMARY LEARNER Patient Patient   PRIMARY LANGUAGE ENGLISH ENGLISH   LEARNER PREFERENCE PRIMARY DEMONSTRATION LISTENING   ANSWERED BY Patient patient   RELATIONSHIP SELF SELF     Depression Screening:  3 most recent [facility] Screens 2/25/2020 2/25/2020 12/11/2018 6/11/2018 12/11/2017 7/25/2017 10/22/2016   PHQ Not Done - - - - - Active Diagnosis of Depression or Bipolar Disorder -   Little interest or pleasure in doing things Not at all Not at all Not at all Not at all Not at all Not at all Several days   Feeling down, depressed, irritable, or hopeless Not at all Not at all Not at all Not at all Not at all Not at all Several days   Total Score PHQ 2 0 0 0 0 0 0 2     Abuse Screening:  Abuse Screening Questionnaire 6/11/2018   Do you ever feel afraid of your partner? N   Are you in a relationship with someone who physically or mentally threatens you? N   Is it safe for you to go home? Y     Fall Risk  Fall Risk Assessment, last 12 mths 8/19/2021 2/25/2020 12/11/2018 6/11/2018 12/11/2017 7/25/2017 11/11/2016   Able to walk? Yes Yes Yes Yes Yes Yes Yes   Fall in past 12 months? 0 No No No No No Yes   Do you feel unsteady?  0 - - - - - -   Are you worried about falling 0 - - - - - -   Number of falls in past 12 months - - - - - - 2   Fall with injury? - - - - - - 1     Recent Travel Screening and Travel History documentation     Travel Screening      No screening recorded since 08/18/21 0000      Travel History   Travel since 07/19/21     No documented travel since 07/19/21

## 2021-08-19 NOTE — PROGRESS NOTES
HISTORY OF PRESENT ILLNESS  Virginia Kayser is a 77 y.o. male. /73 (BP 1 Location: Right arm, BP Patient Position: Sitting, BP Cuff Size: Adult)   Pulse 72   Temp (!) 96.6 °F (35.9 °C) (Temporal)   Resp 17   Ht 5' 11\" (1.803 m)   Wt 209 lb (94.8 kg)   SpO2 97%   BMI 29.15 kg/m²     He had some hematuria and went to the [facility] as he is generally living in [place] most of the time now but still has a home here. There was found to be a mass on his left kidney (left upper pole)    AND there was an intraluminal bladder mass on the bladder dome. [facility] and is down here for f/u care as they needed to refer him out for care which was not available at the [place]    He was referred to [facility] [place] and has an appt with Lory Leger on 9/15/21. Needs a referral to be sent    Blood sugar problem  The history is provided by the patient. This is a chronic problem. The current episode started more than 1 week ago. The problem occurs daily. Cholesterol Problem  The history is provided by the patient. This is a chronic problem. The current episode started more than 1 week ago. The problem occurs daily. Tick Removal  The history is provided by the patient (tick had been imbedded 24-48 hours. Got on him about 3 days ago. ). This is a new problem. Other  History provided by: see comments. This is a new problem. Review of Systems   Constitutional: Negative for chills and fever. Respiratory: Negative. Cardiovascular: Negative. Genitourinary: Positive for hematuria. Skin:        Tick bite on right flank. Physical Exam  Vitals and nursing note reviewed. Constitutional:       General: He is not in acute distress. Appearance: Normal appearance. He is well-developed. He is not diaphoretic. HENT:      Head: Normocephalic and atraumatic. Cardiovascular:      Rate and Rhythm: Normal rate and regular rhythm.    Pulmonary:      Effort: Pulmonary effort is normal.      Breath sounds: Normal breath sounds. Musculoskeletal:        Back:       Right lower leg: No edema. Left lower leg: No edema. Skin:     General: Skin is warm and dry. Neurological:      Mental Status: He is alert and oriented to person, place, and time. Psychiatric:         Mood and Affect: Mood normal.         Behavior: Behavior normal.         ASSESSMENT and PLAN    ICD-10-CM ICD-9-CM    1. Elevated blood sugar  P86.5 783.66 METABOLIC PANEL, COMPREHENSIVE      LIPID PANEL      URINALYSIS W/ RFLX MICROSCOPIC      HEMOGLOBIN A1C W/O EAG   2. Dyslipidemia  E78.5 272.4    3. Tick bite, initial encounter  W57. XXXA 919.4 CBC WITH AUTOMATED DIFF     E906.4    4. Elevated blood pressure reading  R03.0 796.2    5. Hematuria, unspecified type  R31.9 599.70 URINALYSIS W/ RFLX MICROSCOPIC      CBC WITH AUTOMATED DIFF   6. Bladder mass  N32.89 596.89 REFERRAL TO UROLOGY   7.  Renal mass, left  N28.89 593.9 REFERRAL TO UROLOGY       Update lab    PET scan is available in [employer]    Refer to urology, Dr. Sarita Gamez    Will tx tick bite with single 200 mg dose doxycycline    F/u prn

## 2021-11-19 ENCOUNTER — DOCUMENTATION ONLY (OUTPATIENT)
Dept: INTERNAL MEDICINE CLINIC | Age: 76
End: 2021-11-19

## 2021-11-19 NOTE — PROGRESS NOTES
Left message of VM  Need to clarify upcoming biopsy? Is it still on? Does he need another medical clearance?

## 2021-11-19 NOTE — PROGRESS NOTES
Patient stopped by the office to clarify that YOU are still his PCP. States he is not sure how everything got mixed up with [facility] during his surgeries but YOU are still his PCP. Apologizes for any confusion in this matter.

## 2022-01-27 DIAGNOSIS — E78.5 DYSLIPIDEMIA: ICD-10-CM

## 2022-01-27 DIAGNOSIS — Z76.0 MEDICATION REFILL: ICD-10-CM

## 2022-01-27 RX ORDER — SIMVASTATIN 40 MG/1
40 TABLET, FILM COATED ORAL DAILY
Qty: 90 TABLET | Refills: 5 | Status: SHIPPED | OUTPATIENT
Start: 2022-01-27 | End: 2022-07-28